# Patient Record
Sex: FEMALE | Race: WHITE | NOT HISPANIC OR LATINO | Employment: FULL TIME | URBAN - METROPOLITAN AREA
[De-identification: names, ages, dates, MRNs, and addresses within clinical notes are randomized per-mention and may not be internally consistent; named-entity substitution may affect disease eponyms.]

---

## 2017-01-01 ENCOUNTER — TRANSFERRED RECORDS (OUTPATIENT)
Dept: HEALTH INFORMATION MANAGEMENT | Facility: CLINIC | Age: 57
End: 2017-01-01

## 2017-04-03 ENCOUNTER — OFFICE VISIT (OUTPATIENT)
Dept: OBGYN | Facility: CLINIC | Age: 57
End: 2017-04-03
Payer: COMMERCIAL

## 2017-04-03 ENCOUNTER — RADIANT APPOINTMENT (OUTPATIENT)
Dept: MAMMOGRAPHY | Facility: CLINIC | Age: 57
End: 2017-04-03
Payer: COMMERCIAL

## 2017-04-03 VITALS
WEIGHT: 143 LBS | SYSTOLIC BLOOD PRESSURE: 118 MMHG | BODY MASS INDEX: 21.67 KG/M2 | HEART RATE: 72 BPM | HEIGHT: 68 IN | DIASTOLIC BLOOD PRESSURE: 74 MMHG

## 2017-04-03 DIAGNOSIS — Z78.0 MENOPAUSE: ICD-10-CM

## 2017-04-03 DIAGNOSIS — Z12.31 VISIT FOR SCREENING MAMMOGRAM: ICD-10-CM

## 2017-04-03 DIAGNOSIS — Z01.419 ENCOUNTER FOR GYNECOLOGICAL EXAMINATION WITHOUT ABNORMAL FINDING: Primary | ICD-10-CM

## 2017-04-03 PROCEDURE — G0145 SCR C/V CYTO,THINLAYER,RESCR: HCPCS | Performed by: OBSTETRICS & GYNECOLOGY

## 2017-04-03 PROCEDURE — G0202 SCR MAMMO BI INCL CAD: HCPCS | Mod: TC

## 2017-04-03 PROCEDURE — 99396 PREV VISIT EST AGE 40-64: CPT | Performed by: OBSTETRICS & GYNECOLOGY

## 2017-04-03 RX ORDER — ESTRADIOL 1 MG/1
1 TABLET ORAL DAILY
Qty: 90 TABLET | Refills: 4 | Status: SHIPPED | OUTPATIENT
Start: 2017-04-03 | End: 2018-04-05

## 2017-04-03 ASSESSMENT — ANXIETY QUESTIONNAIRES
1. FEELING NERVOUS, ANXIOUS, OR ON EDGE: SEVERAL DAYS
5. BEING SO RESTLESS THAT IT IS HARD TO SIT STILL: NOT AT ALL
2. NOT BEING ABLE TO STOP OR CONTROL WORRYING: NOT AT ALL
IF YOU CHECKED OFF ANY PROBLEMS ON THIS QUESTIONNAIRE, HOW DIFFICULT HAVE THESE PROBLEMS MADE IT FOR YOU TO DO YOUR WORK, TAKE CARE OF THINGS AT HOME, OR GET ALONG WITH OTHER PEOPLE: NOT DIFFICULT AT ALL
3. WORRYING TOO MUCH ABOUT DIFFERENT THINGS: SEVERAL DAYS
GAD7 TOTAL SCORE: 6
7. FEELING AFRAID AS IF SOMETHING AWFUL MIGHT HAPPEN: NOT AT ALL
6. BECOMING EASILY ANNOYED OR IRRITABLE: MORE THAN HALF THE DAYS

## 2017-04-03 ASSESSMENT — PATIENT HEALTH QUESTIONNAIRE - PHQ9: 5. POOR APPETITE OR OVEREATING: MORE THAN HALF THE DAYS

## 2017-04-03 NOTE — MR AVS SNAPSHOT
"              After Visit Summary   4/3/2017    Leny Wilcox    MRN: [id number]           Patient Information     Date Of Birth          1960        Visit Information        Provider Department      4/3/2017 1:30 PM Mack León MD [facility]        Today's Diagnoses     Encounter for gynecological examination without abnormal finding    -  1    Menopause           Follow-ups after your visit        Who to contact     If you have questions or need follow up information about today's clinic visit or your schedule please contact [facility] directly at [phone].  Normal or non-critical lab and imaging results will be communicated to you by [employer], letter or phone within 4 business days after the clinic has received the results. If you do not hear from us within 7 days, please contact the clinic through [employer] or phone. If you have a critical or abnormal lab result, we will notify you by phone as soon as possible.  Submit refill requests through [employer] or call your pharmacy and they will forward the refill request to us. Please allow 3 business days for your refill to be completed.          Additional Information About Your Visit        MyChart Information     [employer] lets you send messages to your doctor, view your test results, renew your prescriptions, schedule appointments and more. To sign up, go to [email or web address] . Click on \"Log in\" on the left side of the screen, which will take you to the Welcome page. Then click on \"Sign up Now\" on the right side of the page.     You will be asked to enter the access code listed below, as well as some personal information. Please follow the directions to create your username and password.     Your access code is: [email or web address]  Expires: 2017  1:53 PM     Your access code will  in 90 days. If you need help or a new code, please call your [facility] or [phone].        Care EveryWhere ID     " "This is your Care EveryWhere ID. This could be used by other organizations to access your [place] medical records  [id number]        Your Vitals Were     Pulse Height BMI (Body Mass Index)             72 5' 8\" (1.727 m) 21.74 kg/m2          Blood Pressure from Last 3 Encounters:   04/03/17 118/74   12/05/16 126/72   02/01/16 110/60    Weight from Last 3 Encounters:   04/03/17 143 lb (64.9 kg)   12/05/16 140 lb (63.5 kg)   02/01/16 138 lb (62.6 kg)              We Performed the Following     Pap imaged thin layer screen reflex to HPV if ASCUS - recommended age 25 - 29 years          Where to get your medicines      These medications were sent to [facility] [id number] - [address] AT [facility] [address]  [address] [place] [address], [place] [address]     Phone:  [phone]     estradiol 1 MG tablet          Primary Care Provider    None Specified       No primary provider on file.        Thank you!     Thank you for choosing [facility] [place]  for your care. Our goal is always to provide you with excellent care. Hearing back from our patients is one way we can continue to improve our services. Please take a few minutes to complete the written survey that you may receive in the mail after your visit with us. Thank you!             Your Updated Medication List - Protect others around you: Learn how to safely use, store and throw away your medicines at www.disposemymeds.org.          This list is accurate as of: 4/3/17  2:31 PM.  Always use your most recent med list.                   Brand Name Dispense Instructions for use    estradiol 1 MG tablet    ESTRACE    90 tablet    Take 1 tablet (1 mg) by mouth daily       multivitamin, therapeutic with minerals Tabs tablet      Take 1 tablet by mouth daily         "

## 2017-04-03 NOTE — PROGRESS NOTES
Leny is a 56 year old  female who presents for annual exam.     Besides routine health maintenance,  she would like to discuss trouble sleeping. Would like to get on medication to help.    HPI: The patient is seen for her annual exam. She is stressed by the needs of her adult children and her  having just completed therapy for prostate cancer. She is not sleeping well and is already on Estrace 1 mg a day.  No primary care provider on file..        GYNECOLOGIC HISTORY:    No LMP recorded. Patient has had a hysterectomy.  Her current contraception method is: hysterectomy.  She  reports that she has never smoked. She has never used smokeless tobacco.    Patient is not sexually active.  STD testing offered?  Declined  Last PHQ-9 score on record =   PHQ-9 SCORE 4/3/2017   Total Score 9     Last GAD7 score on record =   CECILIA-7 SCORE 4/3/2017   Total Score 6     Alcohol Score = 2    HEALTH MAINTENANCE:  Cholesterol: 12   Total= 228, Triglycerides=61, HDL=90, RRF=129   Last Mammo: 16, Result: normal, Next Mammo: today   Pap: 16 neg  Colonoscopy:  approx 2017 per patient, Result: normal, Next Colonoscopy: repeat in 5 years.  Dexa:  1/19/15    Health maintenance updated:  yes    HISTORY:  Obstetric History       T5      TAB0   SAB0   E0   M0   L5       # Outcome Date GA Lbr Ramírez/2nd Weight Sex Delivery Anes PTL Lv   5 Term            4 Term            3 Term            2 Term            1 Term                   Patient Active Problem List   Diagnosis     Arthritis     Past Surgical History:   Procedure Laterality Date     COLONOSCOPY       LAPAROSCOPIC ASSISTED HYSTERECTOMY VAGINAL, BILATERAL SALPINGO-OOPHORECTOMY, COMBINED       REPAIR FISTULA RECTOVAGINAL        Social History   Substance Use Topics     Smoking status: Never Smoker     Smokeless tobacco: Never Used     Alcohol use 0.0 oz/week     0 Standard drinks or equivalent per week      Comment: occ      Problem  "(# of Occurrences) Relation (Name,Age of Onset)    Arthritis (1) Mother    Asthma (1) Mother    Colon Cancer (1) Father    DIABETES (1) Maternal Grandmother    Heart Failure (1) Mother    Hypertension (2) Mother, Father    Substance Abuse (1) Brother            Current Outpatient Prescriptions   Medication Sig     estradiol (ESTRACE) 1 MG tablet Take 1 tablet (1 mg) by mouth daily     multivitamin, therapeutic with minerals (THERA-VIT-M) TABS Take 1 tablet by mouth daily     No current facility-administered medications for this visit.      No Known Allergies    Past medical, surgical, social and family histories were reviewed and updated in EPIC.    ROS:   12 point review of systems negative other than symptoms noted below.  Psychiatric: Difficulty Sleeping    EXAM:  Ht 5' 8\" (1.727 m)  Wt 143 lb (64.9 kg)  BMI 21.74 kg/m2   BMI: Body mass index is 21.74 kg/(m^2).    PHYSICAL EXAM:  Constitutional:  Appearance: Well nourished, well developed, alert, in no acute distress  Neck:  Lymph Nodes:  No lymphadenopathy present    Thyroid:  Gland size normal, nontender, no nodules or masses present  on palpation  Chest:  Respiratory Effort:  Breathing unlabored  Cardiovascular:    Heart: Auscultation:  Regular rate, normal rhythm, no murmurs present  Breasts: Inspection of Breasts:  No lymphadenopathy present    Palpation of Breasts and Axillae:  No masses present on palpation, no  breast tenderness    Axillary Lymph Nodes:  No lymphadenopathy present  Gastrointestinal:   Abdominal Examination:  Abdomen nontender to palpation, tone normal without rigidity or guarding, no masses present, umbilicus without lesions   Liver and Spleen:  No hepatomegaly present, liver nontender to palpation    Hernias:  No hernias present  Lymphatic: Lymph Nodes:  No other lymphadenopathy present  Skin:  General Inspection:  No rashes present, no lesions present, no areas of  discoloration    Genitalia and Groin:  No rashes present, no lesions " present, no areas of  discoloration, no masses present  Neurologic/Psychiatric:    Mental Status:  Oriented X3     Pelvic Exam:  External Genitalia:     Normal appearance for age, no discharge present, no tenderness present, no inflammatory lesions present, color normal  Vagina:     Normal vaginal vault without central or paravaginal defects, no discharge present, no inflammatory lesions present, no masses present  Bladder:     Nontender to palpation  Urethra:   Urethral Body:  Urethra palpation normal, urethra structural support normal   Urethral Meatus:  No erythema or lesions present  Cervix:     Surgically absent  Uterus:     Surgically absent  Adnexa:     Surgically absent  Perineum:     Perineum within normal limits, no evidence of trauma, no rashes or skin lesions present  Anus:     Anus within normal limits, no hemorrhoids present  Inguinal Lymph Nodes:     No lymphadenopathy present    COUNSELING:   Reviewed preventive health counseling, as reflected in patient instructions       Regular exercise       Healthy diet/nutrition    BMI: Body mass index is 21.74 kg/(m^2).      ASSESSMENT:  56 year old female with satisfactory annual exam.    ICD-10-CM    1. Encounter for gynecological examination without abnormal finding Z01.419        PLAN: We will convey both her Pap smear and mammogram results. We will refill her Estrace.      Mack León MD

## 2017-04-03 NOTE — LETTER
[facility]  [address] [place] [address] , [address]  HYACINTH Hernandez   [phone]      4/5/2017     Leny Wilcox   [facility] [address]      Dear Leny,  We are happy to inform you that your PAP smear result is normal.  We are now able to do a follow up test on PAP smears. The DNA test is for HPV (Human Papilloma Virus). Cervical cancer is closely linked with certain types of HPV. Your result showed no evidence of HPV.  Therefore we recommend you return in 1 year for your next pap smear.  You will still need to return to the clinic every year for an annual exam and other preventive tests.  Please contact the clinic with any questions.  Sincerely,    Mack Romero MD

## 2017-04-04 ASSESSMENT — PATIENT HEALTH QUESTIONNAIRE - PHQ9: SUM OF ALL RESPONSES TO PHQ QUESTIONS 1-9: 9

## 2017-04-04 ASSESSMENT — ANXIETY QUESTIONNAIRES: GAD7 TOTAL SCORE: 6

## 2017-04-05 LAB
COPATH REPORT: NORMAL
PAP: NORMAL

## 2017-12-05 DIAGNOSIS — Z78.0 MENOPAUSE: ICD-10-CM

## 2017-12-05 RX ORDER — ESTRADIOL 1 MG/1
TABLET ORAL
Qty: 90 TABLET | Refills: 0 | OUTPATIENT
Start: 2017-12-05

## 2017-12-05 NOTE — TELEPHONE ENCOUNTER
Estradiol 1mg      Last Written Prescription Date:  4/3/17  Last Fill Quantity: 90,   # refills: 4  Last Office Visit with [facility] primary care provider:  4/3/17  Future Office visit: none    Refill request too soon, Rx denied.

## 2018-04-05 ENCOUNTER — RADIANT APPOINTMENT (OUTPATIENT)
Dept: BONE DENSITY | Facility: CLINIC | Age: 58
End: 2018-04-05
Attending: OBSTETRICS & GYNECOLOGY
Payer: COMMERCIAL

## 2018-04-05 ENCOUNTER — OFFICE VISIT (OUTPATIENT)
Dept: OBGYN | Facility: CLINIC | Age: 58
End: 2018-04-05
Payer: COMMERCIAL

## 2018-04-05 ENCOUNTER — RADIANT APPOINTMENT (OUTPATIENT)
Dept: MAMMOGRAPHY | Facility: CLINIC | Age: 58
End: 2018-04-05
Payer: COMMERCIAL

## 2018-04-05 VITALS
SYSTOLIC BLOOD PRESSURE: 112 MMHG | WEIGHT: 142 LBS | DIASTOLIC BLOOD PRESSURE: 64 MMHG | HEIGHT: 68 IN | BODY MASS INDEX: 21.52 KG/M2 | HEART RATE: 74 BPM

## 2018-04-05 DIAGNOSIS — Z78.0 MENOPAUSE: ICD-10-CM

## 2018-04-05 DIAGNOSIS — Z01.419 ENCOUNTER FOR GYNECOLOGICAL EXAMINATION WITHOUT ABNORMAL FINDING: Primary | ICD-10-CM

## 2018-04-05 DIAGNOSIS — Z12.31 VISIT FOR SCREENING MAMMOGRAM: ICD-10-CM

## 2018-04-05 PROCEDURE — 99396 PREV VISIT EST AGE 40-64: CPT | Performed by: OBSTETRICS & GYNECOLOGY

## 2018-04-05 PROCEDURE — 77067 SCR MAMMO BI INCL CAD: CPT | Mod: TC

## 2018-04-05 PROCEDURE — 77080 DXA BONE DENSITY AXIAL: CPT | Performed by: OBSTETRICS & GYNECOLOGY

## 2018-04-05 PROCEDURE — 87624 HPV HI-RISK TYP POOLED RSLT: CPT | Performed by: OBSTETRICS & GYNECOLOGY

## 2018-04-05 PROCEDURE — G0145 SCR C/V CYTO,THINLAYER,RESCR: HCPCS | Performed by: OBSTETRICS & GYNECOLOGY

## 2018-04-05 RX ORDER — ESTRADIOL 1 MG/1
1 TABLET ORAL DAILY
Qty: 90 TABLET | Refills: 3 | Status: SHIPPED | OUTPATIENT
Start: 2018-04-05 | End: 2018-04-05

## 2018-04-05 RX ORDER — ESTRADIOL 1 MG/1
1 TABLET ORAL DAILY
Qty: 90 TABLET | Refills: 3 | Status: SHIPPED | OUTPATIENT
Start: 2018-04-05 | End: 2019-05-16

## 2018-04-05 ASSESSMENT — ANXIETY QUESTIONNAIRES
1. FEELING NERVOUS, ANXIOUS, OR ON EDGE: NOT AT ALL
5. BEING SO RESTLESS THAT IT IS HARD TO SIT STILL: NOT AT ALL
7. FEELING AFRAID AS IF SOMETHING AWFUL MIGHT HAPPEN: NOT AT ALL
2. NOT BEING ABLE TO STOP OR CONTROL WORRYING: NOT AT ALL
3. WORRYING TOO MUCH ABOUT DIFFERENT THINGS: NOT AT ALL
GAD7 TOTAL SCORE: 0
6. BECOMING EASILY ANNOYED OR IRRITABLE: NOT AT ALL
IF YOU CHECKED OFF ANY PROBLEMS ON THIS QUESTIONNAIRE, HOW DIFFICULT HAVE THESE PROBLEMS MADE IT FOR YOU TO DO YOUR WORK, TAKE CARE OF THINGS AT HOME, OR GET ALONG WITH OTHER PEOPLE: NOT DIFFICULT AT ALL

## 2018-04-05 ASSESSMENT — PATIENT HEALTH QUESTIONNAIRE - PHQ9: 5. POOR APPETITE OR OVEREATING: NOT AT ALL

## 2018-04-05 NOTE — MR AVS SNAPSHOT
"              After Visit Summary   2018    Leny Wilcox    MRN: [id number]           Patient Information     Date Of Birth          1960        Visit Information        Provider Department      2018 3:15 PM Mack Le[facility] MD [facility]        Today's Diagnoses     Encounter for gynecological examination without abnormal finding    -  1    Menopause           Follow-ups after your visit        Who to contact     If you have questions or need follow up information about today's clinic visit or your schedule please contact [facility] directly at [phone].  Normal or non-critical lab and imaging results will be communicated to you by [employer], letter or phone within 4 business days after the clinic has received the results. If you do not hear from us within 7 days, please contact the clinic through [employer] or phone. If you have a critical or abnormal lab result, we will notify you by phone as soon as possible.  Submit refill requests through [employer] or call your pharmacy and they will forward the refill request to us. Please allow 3 business days for your refill to be completed.          Additional Information About Your Visit        MyChart Information     [employer] lets you send messages to your doctor, view your test results, renew your prescriptions, schedule appointments and more. To sign up, go to [email or web address] . Click on \"Log in\" on the left side of the screen, which will take you to the Welcome page. Then click on \"Sign up Now\" on the right side of the page.     You will be asked to enter the access code listed below, as well as some personal information. Please follow the directions to create your username and password.     Your access code is: [email or web address]  Expires: 2018  3:41 PM     Your access code will  in 90 days. If you need help or a new code, please call your [facility] or [phone].        Care EveryWhere ID     " "This is your Care EveryWhere ID. This could be used by other organizations to access your [place] medical records  [id number]        Your Vitals Were     Pulse Height BMI (Body Mass Index)             74 5' 8\" (1.727 m) 21.59 kg/m2          Blood Pressure from Last 3 Encounters:   04/05/18 112/64   04/03/17 118/74   12/05/16 126/72    Weight from Last 3 Encounters:   04/05/18 142 lb (64.4 kg)   04/03/17 143 lb (64.9 kg)   12/05/16 140 lb (63.5 kg)              We Performed the Following     HPV High Risk Types DNA Cervical     Pap imaged thin layer screen with HPV - recommended age 30 - 65          Today's Medication Changes          These changes are accurate as of 4/5/18  3:41 PM.  If you have any questions, ask your nurse or doctor.               Start taking these medicines.        Dose/Directions    estradiol 1 MG tablet   Commonly known as:  ESTRACE   Used for:  Menopause   Started by:  Mack León MD        Dose:  1 mg   Take 1 tablet (1 mg) by mouth daily   Quantity:  90 tablet   Refills:  3            Where to get your medicines      These medications were sent to [facility] [id number] [place] [address] AT [facility] [address]  [address] [place] [address], [place] [address]     Phone:  [phone]     estradiol 1 MG tablet                Primary Care Provider Office Phone # Fax #    [facility] [phone]       [place] [address]  [facility] [address]  [facility] [address]        Equal Access to Services     JOSUE JENKINS : Hadii bud meza hadasho Soomaali, waaxda luqadaha, qaybta kaalmada adeegdana, aleksey jo. So [facility] [phone].    ATENCIÓN: Si habla español, tiene a mitchell disposición servicios gratuitos de asistencia lingüística. Llame al [phone].    We comply with applicable federal civil rights laws and [place] laws. We do not discriminate on the basis of race, " color, national origin, age, disability, sex, sexual orientation, or gender identity.            Thank you!     Thank you for choosing [facility] [place]  for your care. Our goal is always to provide you with excellent care. Hearing back from our patients is one way we can continue to improve our services. Please take a few minutes to complete the written survey that you may receive in the mail after your visit with us. Thank you!             Your Updated Medication List - Protect others around you: Learn how to safely use, store and throw away your medicines at www.disposemymeds.org.          This list is accurate as of 4/5/18  3:41 PM.  Always use your most recent med list.                   Brand Name Dispense Instructions for use Diagnosis    estradiol 1 MG tablet    ESTRACE    90 tablet    Take 1 tablet (1 mg) by mouth daily    Menopause       multivitamin, therapeutic with minerals Tabs tablet      Take 1 tablet by mouth daily

## 2018-04-05 NOTE — Clinical Note
Please abstract the following data from this visit with this patient into the appropriate field in Epic:  Colonoscopy done on this date: 01/01/2017 (approximately), by this group: [facility] by Dr Vicente Brooks, results were some polyps that were removed. Needs 5 year f/u

## 2018-04-05 NOTE — PROGRESS NOTES
Leny is a 57 year old  female who presents for annual exam.     Besides routine health maintenance, she has no other health concerns today .    HPI: The patient is seen at this time for her annual exam.  She is on estrogen replacement therapy and doing well.  She is concerned about her bone strength in her last DEXA was in .  The patient's past PCP moved, so currently doesn't have one.      GYNECOLOGIC HISTORY:    No LMP recorded. Patient has had a hysterectomy.  Her current contraception method is: hysterectomy and not sexually active.  She  reports that she has never smoked. She has never used smokeless tobacco.    Patient is not sexually active.  STD testing offered?  Declined  Last PHQ-9 score on record =   PHQ-9 SCORE 2018   Total Score 0     Last GAD7 score on record =   CECILIA-7 SCORE 2018   Total Score 0     Alcohol Score = 1    HEALTH MAINTENANCE:  Lipids: 12   Total= 228, Triglycerides=61, HDL=90, MHH=592  Last Mammo: one year ago, Result: normal, Next Mammo: today   Pap:   Lab Results   Component Value Date    PAP NIL 2017    PAP NIL 2016      Colonoscopy:  2017, Result: polyps, Next Colonoscopy: 4 years.  Dexa:  01/19/15    Health maintenance updated:  yes    HISTORY:  Obstetric History       T5      L5     SAB0   TAB0   Ectopic0   Multiple0   Live Births0       # Outcome Date GA Lbr Ramírez/2nd Weight Sex Delivery Anes PTL Lv   5 Term            4 Term            3 Term            2 Term            1 Term                   Patient Active Problem List   Diagnosis     Arthritis     Past Surgical History:   Procedure Laterality Date     COLONOSCOPY       LAPAROSCOPIC ASSISTED HYSTERECTOMY VAGINAL, BILATERAL SALPINGO-OOPHORECTOMY, COMBINED       REPAIR FISTULA RECTOVAGINAL        Social History   Substance Use Topics     Smoking status: Never Smoker     Smokeless tobacco: Never Used     Alcohol use 0.0 oz/week     0 Standard drinks or equivalent  "per week      Comment: occ      Problem (# of Occurrences) Relation (Name,Age of Onset)    Arthritis (1) Mother    Asthma (1) Mother    Colon Cancer (1) Father    DIABETES (1) Maternal Grandmother    Heart Failure (1) Mother    Hypertension (2) Mother, Father    Substance Abuse (1) Brother            Current Outpatient Prescriptions   Medication Sig     estradiol (ESTRACE) 1 MG tablet Take 1 tablet (1 mg) by mouth daily     multivitamin, therapeutic with minerals (THERA-VIT-M) TABS Take 1 tablet by mouth daily     No current facility-administered medications for this visit.      No Known Allergies    Past medical, surgical, social and family histories were reviewed and updated in EPIC.    ROS:   12 point review of systems negative other than symptoms noted below.  Constitutional: Weight Gain  Genitourinary: Urgency  Musculoskeletal: Muscle Cramps    EXAM:  /64  Pulse 74  Ht 5' 8\" (1.727 m)  Wt 142 lb (64.4 kg)  BMI 21.59 kg/m2   BMI: Body mass index is 21.59 kg/(m^2).    PHYSICAL EXAM:  Constitutional:  Appearance: Well nourished, well developed, alert, in no acute distress  Neck:  Lymph Nodes:  No lymphadenopathy present    Thyroid:  Gland size normal, nontender, no nodules or masses present  on palpation  Chest:  Respiratory Effort:  Breathing unlabored  Cardiovascular:    Heart: Auscultation:  Regular rate, normal rhythm, no murmurs present  Breasts: Inspection of Breasts:  No lymphadenopathy present., Palpation of Breasts and Axillae:  No masses present on palpation, no breast tenderness., Axillary Lymph Nodes:  No lymphadenopathy present. and No nodularity, asymmetry or nipple discharge bilaterally.  Gastrointestinal:   Abdominal Examination:  Abdomen nontender to palpation, tone normal without rigidity or guarding, no masses present, umbilicus without lesions   Liver and Spleen:  No hepatomegaly present, liver nontender to palpation    Hernias:  No hernias present  Lymphatic: Lymph Nodes:  No other " lymphadenopathy present  Skin:  General Inspection:  No rashes present, no lesions present, no areas of  discoloration    Genitalia and Groin:  No rashes present, no lesions present, no areas of  discoloration, no masses present  Neurologic/Psychiatric:    Mental Status:  Oriented X3     Pelvic Exam:  External Genitalia:     Normal appearance for age, no discharge present, no tenderness present, no inflammatory lesions present, color normal  Vagina:     Normal vaginal vault without central or paravaginal defects, no discharge present, no inflammatory lesions present, no masses present  Bladder:     Nontender to palpation  Urethra:   Urethral Body:  Urethra palpation normal, urethra structural support normal   Urethral Meatus:  No erythema or lesions present  Cervix:     Surgically absent  Uterus:     Surgically absent  Adnexa:     Surgically absent  Perineum:     Perineum within normal limits, no evidence of trauma, no rashes or skin lesions present  Anus:     Anus within normal limits, no hemorrhoids present  Inguinal Lymph Nodes:     No lymphadenopathy present    COUNSELING:   Reviewed preventive health counseling, as reflected in patient instructions    BMI: Body mass index is 21.59 kg/(m^2).      ASSESSMENT:  57 year old female with satisfactory annual exam.    ICD-10-CM    1. Encounter for gynecological examination without abnormal finding Z01.419 Pap imaged thin layer screen with HPV - recommended age 30 - 65     HPV High Risk Types DNA Cervical   2. Menopause Z78.0 estradiol (ESTRACE) 1 MG tablet       PLAN: We will convey her Pap mammogram and DEXA results when available.      Mack León MD

## 2018-04-05 NOTE — NURSING NOTE
Please abstract the following data from this visit with this patient into the appropriate field in Epic:    Colonoscopy done on this date: 01/01/2017 (approximately), by this group: [facility] by Dr Vicente Brooks, results were some polyps that were removed. Needs 5 year f/u

## 2018-04-06 ASSESSMENT — PATIENT HEALTH QUESTIONNAIRE - PHQ9: SUM OF ALL RESPONSES TO PHQ QUESTIONS 1-9: 0

## 2018-04-06 ASSESSMENT — ANXIETY QUESTIONNAIRES: GAD7 TOTAL SCORE: 0

## 2018-04-10 LAB
COPATH REPORT: NORMAL
PAP: NORMAL

## 2018-04-13 LAB
FINAL DIAGNOSIS: NORMAL
HPV HR 12 DNA CVX QL NAA+PROBE: NEGATIVE
HPV16 DNA SPEC QL NAA+PROBE: NEGATIVE
HPV18 DNA SPEC QL NAA+PROBE: NEGATIVE
SPECIMEN DESCRIPTION: NORMAL
SPECIMEN SOURCE CVX/VAG CYTO: NORMAL

## 2018-09-07 ENCOUNTER — RECORDS - HEALTHEAST (OUTPATIENT)
Dept: ADMINISTRATIVE | Facility: OTHER | Age: 58
End: 2018-09-07

## 2018-09-10 ENCOUNTER — AMBULATORY - HEALTHEAST (OUTPATIENT)
Dept: VASCULAR SURGERY | Facility: CLINIC | Age: 58
End: 2018-09-10

## 2018-09-10 DIAGNOSIS — I87.2 PERIPHERAL VENOUS INSUFFICIENCY: ICD-10-CM

## 2018-12-06 DIAGNOSIS — Z23 NEED FOR SHINGLES VACCINE: ICD-10-CM

## 2018-12-06 PROCEDURE — 90750 HZV VACC RECOMBINANT IM: CPT

## 2018-12-06 PROCEDURE — 90471 IMMUNIZATION ADMIN: CPT

## 2018-12-06 NOTE — PROGRESS NOTES
Screening Questionnaire for Adult Immunization    Are you sick today?   No   Do you have allergies to medications, food, a vaccine component or latex?   No   Have you ever had a serious reaction after receiving a vaccination?   No   Do you have a long-term health problem with heart disease, lung disease, asthma, kidney disease, metabolic disease (e.g. diabetes), anemia, or other blood disorder?   No   Do you have cancer, leukemia, HIV/AIDS, or any other immune system problem?   No   In the past 3 months, have you taken medications that affect  your immune system, such as prednisone, other steroids, or anticancer drugs; drugs for the treatment of rheumatoid arthritis, Crohn s disease, or psoriasis; or have you had radiation treatments?   No   Have you had a seizure, or a brain or other nervous system problem?   No   During the past year, have you received a transfusion of blood or blood     products, or been given immune (gamma) globulin or antiviral drug?   No   For women: Are you pregnant or is there a chance you could become        pregnant during the next month?   No   Have you received any vaccinations in the past 4 weeks?   No     Immunization questionnaire answers were all negative.        Per orders of Dr. León, injection of Shingrix given by Chio Serna. Patient instructed to remain in clinic for 15 minutes afterwards, and to report any adverse reaction to me immediately.       Screening performed by Chio Serna on 12/6/2018 at 2:20 PM.

## 2019-03-05 ENCOUNTER — TELEPHONE (OUTPATIENT)
Dept: OBGYN | Facility: CLINIC | Age: 59
End: 2019-03-05

## 2019-03-05 DIAGNOSIS — N95.1 SYMPTOMS, SUCH AS FLUSHING, SLEEPLESSNESS, HEADACHE, LACK OF CONCENTRATION, ASSOCIATED WITH THE MENOPAUSE: Primary | ICD-10-CM

## 2019-03-05 NOTE — TELEPHONE ENCOUNTER
Discussed with Dr. León.     She is actually on 1mg which is a good dose of estrogen. She has been on this dose for quite a while. With a new change in symptoms, dr. León is requesting she have her thyroid function tested.    Orders placed for future labs. She can go to any [facility] clinic for lab draw and we will go from there.     We will not increase E2 dose at this time.

## 2019-03-05 NOTE — TELEPHONE ENCOUNTER
4/5/18 Annual. Pt states is on the lowest dose of estradiol. Pt has been having night sweats, mood changes and not sleeping well over the last 1.5 months. Pt would like to up the dose of Estradiol.  Annual scheduled for 5/7/19. Routing to Jing Miller. Please advise.     4/5/18 HPI: The patient is seen at this time for her annual exam.  She is on estrogen replacement therapy and doing well.  She is concerned about her bone strength in her last DEXA was in 2015.

## 2019-04-01 DIAGNOSIS — N95.1 SYMPTOMS, SUCH AS FLUSHING, SLEEPLESSNESS, HEADACHE, LACK OF CONCENTRATION, ASSOCIATED WITH THE MENOPAUSE: ICD-10-CM

## 2019-04-01 PROCEDURE — 84443 ASSAY THYROID STIM HORMONE: CPT | Performed by: OBSTETRICS & GYNECOLOGY

## 2019-04-01 PROCEDURE — 36415 COLL VENOUS BLD VENIPUNCTURE: CPT | Performed by: OBSTETRICS & GYNECOLOGY

## 2019-04-02 LAB — TSH SERPL DL<=0.005 MIU/L-ACNC: 2.09 MU/L (ref 0.4–4)

## 2019-05-10 NOTE — PROGRESS NOTES
Leny is a 58 year old  female who presents for annual exam.     Besides routine health maintenance,     HPI: The patient is seen at this time for her annual exam.  She has no real change in her health history is concerned about an increase of a couple pounds in her weight but she is not exercising or dieting at all.  She is not overweight.  She has had a previous hysterectomy and is menopausal.  She has been on Estrace successfully in the past.  The patient's PCP Dr. Crabtree.        GYNECOLOGIC HISTORY:    No LMP recorded. Patient has had a hysterectomy.  Her current contraception method is: not sexually active.  She  reports that she has never smoked. She has never used smokeless tobacco.    Patient is not sexually active.  STD testing offered?  Declined  Last PHQ-9 score on record =   PHQ-9 SCORE 2019   PHQ-9 Total Score 3     Last GAD7 score on record =   CECILIA-7 SCORE 2019   Total Score 1     Alcohol Score = 1    HEALTH MAINTENANCE:  Cholesterol:   Cholesterol   Date Value Ref Range Status   2012 228 (A) 115 - 199 mg/dL Final      Last Mammo: one year ago, Result: normal, Next Mammo: today   Pap:   Lab Results   Component Value Date    PAP NIL HPV- 2018    PAP NIL 2017    PAP NIL 2016      Colonoscopy:  17, Result: polyps, Next Colonoscopy: .  Dexa:  18    Health maintenance updated:  yes    HISTORY:  OB History    Para Term  AB Living   5 5 5 0 0 5   SAB TAB Ectopic Multiple Live Births   0 0 0 0 0      # Outcome Date GA Lbr Ramírez/2nd Weight Sex Delivery Anes PTL Lv   5 Term            4 Term            3 Term            2 Term            1 Term                Patient Active Problem List   Diagnosis     Arthritis     Past Surgical History:   Procedure Laterality Date     COLONOSCOPY       HYSTERECTOMY, PAP NO LONGER INDICATED       LAPAROSCOPIC ASSISTED HYSTERECTOMY VAGINAL, BILATERAL SALPINGO-OOPHORECTOMY, COMBINED       REPAIR FISTULA  "RECTOVAGINAL  2011      Social History     Tobacco Use     Smoking status: Never Smoker     Smokeless tobacco: Never Used   Substance Use Topics     Alcohol use: Yes     Alcohol/week: 0.0 oz     Comment: occ      Problem (# of Occurrences) Relation (Name,Age of Onset)    Arthritis (1) Mother    Asthma (1) Mother    Colon Cancer (1) Father    Diabetes (1) Maternal Grandmother    Heart Failure (1) Mother    Hypertension (2) Mother, Father    Substance Abuse (1) Brother            Current Outpatient Medications   Medication Sig     estradiol (ESTRACE) 1 MG tablet Take 1 tablet (1 mg) by mouth daily     multivitamin, therapeutic with minerals (THERA-VIT-M) TABS Take 1 tablet by mouth daily     No current facility-administered medications for this visit.      No Known Allergies    Past medical, surgical, social and family histories were reviewed and updated in EPIC.    ROS:   12 point review of systems negative other than symptoms noted below.  Constitutional: Weight Gain    EXAM:  /62   Pulse 68   Ht 1.715 m (5' 7.5\")   Wt 65.3 kg (144 lb)   BMI 22.22 kg/m     BMI: Body mass index is 22.22 kg/m .    PHYSICAL EXAM:  Constitutional:  Appearance: Well nourished, well developed, alert, in no acute distress  Neck:  Lymph Nodes:  No lymphadenopathy present    Thyroid:  Gland size normal, nontender, no nodules or masses present  on palpation  Chest:  Respiratory Effort:  Breathing unlabored  Cardiovascular:    Heart: Auscultation:  Regular rate, normal rhythm, no murmurs present  Breasts: Inspection of Breasts:  No lymphadenopathy present., Palpation of Breasts and Axillae:  No masses present on palpation, no breast tenderness., Axillary Lymph Nodes:  No lymphadenopathy present. and No nodularity, asymmetry or nipple discharge bilaterally.  Gastrointestinal:   Abdominal Examination:  Abdomen nontender to palpation, tone normal without rigidity or guarding, no masses present, umbilicus without lesions   Liver and " Spleen:  No hepatomegaly present, liver nontender to palpation    Hernias:  No hernias present  Lymphatic: Lymph Nodes:  No other lymphadenopathy present  Skin:  General Inspection:  No rashes present, no lesions present, no areas of  discoloration    Genitalia and Groin:  No rashes present, no lesions present, no areas of  discoloration, no masses present  Neurologic/Psychiatric:    Mental Status:  Oriented X3     Pelvic Exam:  External Genitalia:     Normal appearance for age, no discharge present, no tenderness present, no inflammatory lesions present, color normal  Vagina:     Normal vaginal vault without central or paravaginal defects, no discharge present, no inflammatory lesions present, no masses present  Bladder:     Nontender to palpation  Urethra:   Urethral Body:  Urethra palpation normal, urethra structural support normal   Urethral Meatus:  No erythema or lesions present  Cervix:     Surgically absent  Uterus:     Surgically absent  Adnexa:     Surgically absent  Perineum:     Perineum within normal limits, no evidence of trauma, no rashes or skin lesions present  Anus:     Anus within normal limits, no hemorrhoids present  Inguinal Lymph Nodes:     No lymphadenopathy present    COUNSELING:   Reviewed preventive health counseling, as reflected in patient instructions       Regular exercise       Healthy diet/nutrition    BMI: Body mass index is 22.22 kg/m .      ASSESSMENT:  58 year old female with satisfactory annual exam.    ICD-10-CM    1. Encounter for gynecological examination without abnormal finding Z01.419 Pap imaged thin layer screen with HPV - recommended age 30 - 65     HPV High Risk Types DNA Cervical   2. Menopause Z78.0        PLAN: The patient is seen at this time for annual exam.  Her BMI is completely normal.  We have encouraged her to exercise.  We will convey her mammogram and Pap results when available.  She has had a hysterectomy and is never had an abnormal Pap and will probably  need no further Paps.      Mack León MD

## 2019-05-16 ENCOUNTER — ANCILLARY PROCEDURE (OUTPATIENT)
Dept: MAMMOGRAPHY | Facility: CLINIC | Age: 59
End: 2019-05-16
Payer: COMMERCIAL

## 2019-05-16 ENCOUNTER — OFFICE VISIT (OUTPATIENT)
Dept: OBGYN | Facility: CLINIC | Age: 59
End: 2019-05-16
Payer: COMMERCIAL

## 2019-05-16 VITALS
SYSTOLIC BLOOD PRESSURE: 104 MMHG | HEART RATE: 68 BPM | HEIGHT: 68 IN | DIASTOLIC BLOOD PRESSURE: 62 MMHG | WEIGHT: 144 LBS | BODY MASS INDEX: 21.82 KG/M2

## 2019-05-16 DIAGNOSIS — Z01.419 ENCOUNTER FOR GYNECOLOGICAL EXAMINATION WITHOUT ABNORMAL FINDING: Primary | ICD-10-CM

## 2019-05-16 DIAGNOSIS — Z12.31 VISIT FOR SCREENING MAMMOGRAM: ICD-10-CM

## 2019-05-16 DIAGNOSIS — Z78.0 MENOPAUSE: ICD-10-CM

## 2019-05-16 PROCEDURE — 99396 PREV VISIT EST AGE 40-64: CPT | Performed by: OBSTETRICS & GYNECOLOGY

## 2019-05-16 PROCEDURE — 87624 HPV HI-RISK TYP POOLED RSLT: CPT | Performed by: OBSTETRICS & GYNECOLOGY

## 2019-05-16 PROCEDURE — 77067 SCR MAMMO BI INCL CAD: CPT | Mod: TC

## 2019-05-16 PROCEDURE — G0145 SCR C/V CYTO,THINLAYER,RESCR: HCPCS | Performed by: OBSTETRICS & GYNECOLOGY

## 2019-05-16 RX ORDER — ESTRADIOL 1 MG/1
1 TABLET ORAL DAILY
Qty: 90 TABLET | Refills: 3 | Status: SHIPPED | OUTPATIENT
Start: 2019-05-16 | End: 2020-05-20

## 2019-05-16 ASSESSMENT — ANXIETY QUESTIONNAIRES
2. NOT BEING ABLE TO STOP OR CONTROL WORRYING: NOT AT ALL
GAD7 TOTAL SCORE: 1
1. FEELING NERVOUS, ANXIOUS, OR ON EDGE: NOT AT ALL
IF YOU CHECKED OFF ANY PROBLEMS ON THIS QUESTIONNAIRE, HOW DIFFICULT HAVE THESE PROBLEMS MADE IT FOR YOU TO DO YOUR WORK, TAKE CARE OF THINGS AT HOME, OR GET ALONG WITH OTHER PEOPLE: SOMEWHAT DIFFICULT
5. BEING SO RESTLESS THAT IT IS HARD TO SIT STILL: NOT AT ALL
7. FEELING AFRAID AS IF SOMETHING AWFUL MIGHT HAPPEN: NOT AT ALL
3. WORRYING TOO MUCH ABOUT DIFFERENT THINGS: NOT AT ALL
6. BECOMING EASILY ANNOYED OR IRRITABLE: SEVERAL DAYS

## 2019-05-16 ASSESSMENT — PATIENT HEALTH QUESTIONNAIRE - PHQ9
SUM OF ALL RESPONSES TO PHQ QUESTIONS 1-9: 3
5. POOR APPETITE OR OVEREATING: NOT AT ALL

## 2019-05-16 ASSESSMENT — MIFFLIN-ST. JEOR: SCORE: 1268.74

## 2019-05-17 ASSESSMENT — ANXIETY QUESTIONNAIRES: GAD7 TOTAL SCORE: 1

## 2019-05-21 LAB
COPATH REPORT: NORMAL
PAP: NORMAL

## 2019-08-27 ENCOUNTER — OFFICE VISIT - HEALTHEAST (OUTPATIENT)
Dept: VASCULAR SURGERY | Facility: CLINIC | Age: 59
End: 2019-08-27

## 2019-08-27 ENCOUNTER — COMMUNICATION - HEALTHEAST (OUTPATIENT)
Dept: TELEHEALTH | Facility: CLINIC | Age: 59
End: 2019-08-27

## 2019-08-27 DIAGNOSIS — I83.813 VARICOSE VEINS OF BILATERAL LOWER EXTREMITIES WITH PAIN: ICD-10-CM

## 2019-08-27 ASSESSMENT — MIFFLIN-ST. JEOR: SCORE: 1256.48

## 2019-11-18 ENCOUNTER — RECORDS - HEALTHEAST (OUTPATIENT)
Dept: LAB | Facility: CLINIC | Age: 59
End: 2019-11-18

## 2019-11-18 LAB
ALBUMIN SERPL-MCNC: 3.9 G/DL (ref 3.5–5)
ALP SERPL-CCNC: 81 U/L (ref 45–120)
ALT SERPL W P-5'-P-CCNC: 10 U/L (ref 0–45)
AST SERPL W P-5'-P-CCNC: 18 U/L (ref 0–40)
BILIRUB DIRECT SERPL-MCNC: <0.1 MG/DL
BILIRUB SERPL-MCNC: 0.2 MG/DL (ref 0–1)
LIPASE SERPL-CCNC: 30 U/L (ref 0–52)
PROT SERPL-MCNC: 7 G/DL (ref 6–8)

## 2019-12-17 ENCOUNTER — RECORDS - HEALTHEAST (OUTPATIENT)
Dept: LAB | Facility: CLINIC | Age: 59
End: 2019-12-17

## 2019-12-19 LAB
GLIADIN IGA SER-ACNC: 2.3 U/ML
GLIADIN IGG SER-ACNC: 1.3 U/ML
IGA SERPL-MCNC: 355 MG/DL (ref 65–400)
TTG IGA SER-ACNC: 0.6 U/ML
TTG IGG SER-ACNC: <0.6 U/ML

## 2020-03-10 ENCOUNTER — HEALTH MAINTENANCE LETTER (OUTPATIENT)
Age: 60
End: 2020-03-10

## 2020-05-20 DIAGNOSIS — Z78.0 MENOPAUSE: ICD-10-CM

## 2020-05-20 RX ORDER — ESTRADIOL 1 MG/1
TABLET ORAL
Qty: 90 TABLET | Refills: 0 | Status: SHIPPED | OUTPATIENT
Start: 2020-05-20 | End: 2020-08-18

## 2020-05-20 NOTE — TELEPHONE ENCOUNTER
"Requested Prescriptions   Pending Prescriptions Disp Refills     estradiol (ESTRACE) 1 MG tablet [Pharmacy Med Name: ESTRADIOL 1MG TABLETS] 90 tablet 3     Sig: TAKE 1 TABLET(1 MG) BY MOUTH DAILY       Hormone Replacement Therapy Failed - 5/20/2020  9:37 AM        Failed - Blood pressure under 140/90 in past 12 months     BP Readings from Last 3 Encounters:   05/16/19 104/62   04/05/18 112/64   04/03/17 118/74                 Passed - Recent (12 mo) or future (30 days) visit within the authorizing provider's specialty     Patient has had an office visit with the authorizing provider or a provider within the authorizing providers department within the previous 12 mos or has a future within next 30 days. See \"Patient Info\" tab in inbasket, or \"Choose Columns\" in Meds & Orders section of the refill encounter.              Passed - Patient has mammogram in past 2 years on file if age 50-75        Passed - Medication is active on med list        Passed - Patient is 18 years of age or older        Passed - No active pregnancy on record        Passed - No positive pregnancy test on record in past 12 months           Last Written Prescription Date:  05/16/19  Last Fill Quantity: 90,  # refills: 3   Last office visit: 5/16/2019 with prescribing provider: yes   Future Office Visit:   Next 5 appointments (look out 90 days)    Jun 08, 2020  3:15 PM CDT  PHYSICAL with Mack León MD  [facility] [place] ([facility]) [address] [place] [id number] [phone]         Prescription approved per [facility] Refill Protocol.  Covid 19  Sally Villagomez RN on 5/20/2020 at 3:46 PM      "

## 2020-05-20 NOTE — TELEPHONE ENCOUNTER
"Requested Prescriptions   Pending Prescriptions Disp Refills     estradiol (ESTRACE) 1 MG tablet [Pharmacy Med Name: ESTRADIOL 1MG TABLETS] 90 tablet 3     Sig: TAKE 1 TABLET(1 MG) BY MOUTH DAILY       Hormone Replacement Therapy Failed - 5/20/2020  9:32 AM        Failed - Blood pressure under 140/90 in past 12 months     BP Readings from Last 3 Encounters:   05/16/19 104/62   04/05/18 112/64   04/03/17 118/74                 Passed - Recent (12 mo) or future (30 days) visit within the authorizing provider's specialty     Patient has had an office visit with the authorizing provider or a provider within the authorizing providers department within the previous 12 mos or has a future within next 30 days. See \"Patient Info\" tab in inbasket, or \"Choose Columns\" in Meds & Orders section of the refill encounter.              Passed - Patient has mammogram in past 2 years on file if age 50-75        Passed - Medication is active on med list        Passed - Patient is 18 years of age or older        Passed - No active pregnancy on record        Passed - No positive pregnancy test on record in past 12 months           Last Written Prescription Date:  5/16/19  Last Fill Quantity: 90,  # refills: 3   Last office visit: 5/16/2019 with prescribing provider:  Dr León   Future Office Visit:   Next 5 appointments (look out 90 days)    Jun 08, 2020  3:15 PM CDT  PHYSICAL with Mack León MD  [facility] ([facility]) [address] [place] [id number] [phone]                 "

## 2020-06-02 ENCOUNTER — RECORDS - HEALTHEAST (OUTPATIENT)
Dept: LAB | Facility: CLINIC | Age: 60
End: 2020-06-02

## 2020-06-02 LAB
ALBUMIN SERPL-MCNC: 3.6 G/DL (ref 3.5–5)
ALP SERPL-CCNC: 67 U/L (ref 45–120)
ALT SERPL W P-5'-P-CCNC: 12 U/L (ref 0–45)
ANION GAP SERPL CALCULATED.3IONS-SCNC: 7 MMOL/L (ref 5–18)
AST SERPL W P-5'-P-CCNC: 21 U/L (ref 0–40)
BILIRUB SERPL-MCNC: 0.4 MG/DL (ref 0–1)
BUN SERPL-MCNC: 10 MG/DL (ref 8–22)
CALCIUM SERPL-MCNC: 8.6 MG/DL (ref 8.5–10.5)
CHLORIDE BLD-SCNC: 104 MMOL/L (ref 98–107)
CO2 SERPL-SCNC: 27 MMOL/L (ref 22–31)
CREAT SERPL-MCNC: 0.82 MG/DL (ref 0.6–1.1)
GFR SERPL CREATININE-BSD FRML MDRD: >60 ML/MIN/1.73M2
GLUCOSE BLD-MCNC: 133 MG/DL (ref 70–125)
POTASSIUM BLD-SCNC: 3.9 MMOL/L (ref 3.5–5)
PROT SERPL-MCNC: 6.5 G/DL (ref 6–8)
SODIUM SERPL-SCNC: 138 MMOL/L (ref 136–145)

## 2020-08-17 DIAGNOSIS — Z78.0 MENOPAUSE: ICD-10-CM

## 2020-08-18 RX ORDER — ESTRADIOL 1 MG/1
TABLET ORAL
Qty: 90 TABLET | Refills: 0 | Status: SHIPPED | OUTPATIENT
Start: 2020-08-18 | End: 2020-11-11

## 2020-08-18 NOTE — TELEPHONE ENCOUNTER
"Requested Prescriptions   Pending Prescriptions Disp Refills     estradiol (ESTRACE) 1 MG tablet [Pharmacy Med Name: ESTRADIOL 1MG TABLETS] 90 tablet 0     Sig: TAKE 1 TABLET(1 MG) BY MOUTH DAILY       Hormone Replacement Therapy Failed - 8/17/2020  3:24 PM        Failed - Blood pressure under 140/90 in past 12 months     BP Readings from Last 3 Encounters:   05/16/19 104/62   04/05/18 112/64   04/03/17 118/74                 Failed - Recent (12 mo) or future (30 days) visit within the authorizing provider's specialty     Patient has had an office visit with the authorizing provider or a provider within the authorizing providers department within the previous 12 mos or has a future within next 30 days. See \"Patient Info\" tab in inbasket, or \"Choose Columns\" in Meds & Orders section of the refill encounter.              Passed - Patient has mammogram in past 2 years on file if age 50-75        Passed - Medication is active on med list        Passed - Patient is 18 years of age or older        Passed - No active pregnancy on record        Passed - No positive pregnancy test on record in past 12 months           Last Written Prescription Date:  5/20/20  Last Fill Quantity: 90,  # refills: 0   Last office visit: 5/16/2019 with prescribing provider:  Dr León   Future Office Visit:   Next 5 appointments (look out 90 days)    Oct 12, 2020  1:45 PM CDT  PHYSICAL with Mack León MD  [facility] [place] ([facility]) [address] [place] [id number] [phone]         Prescription approved per [facility] Refill Protocol.  Has annual scheduled.  Jing Beebe RN on 8/18/2020 at 9:14 AM            "

## 2020-11-11 DIAGNOSIS — Z78.0 MENOPAUSE: ICD-10-CM

## 2020-11-11 RX ORDER — ESTRADIOL 1 MG/1
TABLET ORAL
Qty: 30 TABLET | Refills: 0 | Status: SHIPPED | OUTPATIENT
Start: 2020-11-11 | End: 2020-12-03

## 2020-11-11 NOTE — TELEPHONE ENCOUNTER
"Requested Prescriptions   Pending Prescriptions Disp Refills     estradiol (ESTRACE) 1 MG tablet [Pharmacy Med Name: ESTRADIOL 1MG TABLETS] 90 tablet 0     Sig: TAKE 1 TABLET(1 MG) BY MOUTH DAILY       Hormone Replacement Therapy Failed - 11/11/2020 12:32 PM        Failed - Blood pressure under 140/90 in past 12 months     BP Readings from Last 3 Encounters:   05/16/19 104/62   04/05/18 112/64   04/03/17 118/74                 Passed - Recent (12 mo) or future (30 days) visit within the authorizing provider's specialty     Patient has had an office visit with the authorizing provider or a provider within the authorizing providers department within the previous 12 mos or has a future within next 30 days. See \"Patient Info\" tab in inbasket, or \"Choose Columns\" in Meds & Orders section of the refill encounter.              Passed - Patient has mammogram in past 2 years on file if age 50-75        Passed - Medication is active on med list        Passed - Patient is 18 years of age or older        Passed - No active pregnancy on record        Passed - No positive pregnancy test on record in past 12 months           Last Written Prescription Date:  08/18/2020  Last Fill Quantity: 90,  # refills: 0   Last office visit: 5/16/2019 with prescribing provider:  Mau   Future Office Visit:   Next 5 appointments (look out 90 days)    Dec 03, 2020  3:15 PM  PHYSICAL with Mack León MD  [facility] [place] ([facility]) [address] [place] [id number] [phone]         Prescription approved per [facility] Refill Protocol.  Arielle Crandall RN on 11/11/2020 at 12:36 PM        "

## 2020-12-02 NOTE — PROGRESS NOTES
Leny is a 60 year old  female who presents for annual exam.     Besides routine health maintenance, she has no other health concerns today .    HPI: The patient is seen at this time for her annual exam.  She is menopausal and not highly symptomatic.  She is still working full-time.  The patient's PCP is  [facility].        GYNECOLOGIC HISTORY:    No LMP recorded. Patient has had a hysterectomy. LAVH BSO      Her current contraception method is: not sexually active.  She  reports that she has never smoked. She has never used smokeless tobacco.    Patient is not sexually active.  Last PHQ-9 score on record =   PHQ-9 SCORE 12/3/2020   PHQ-9 Total Score 0     Last GAD7 score on record =   CECILIA-7 SCORE 12/3/2020   Total Score 0     Alcohol Score = 1    HEALTH MAINTENANCE:  Cholesterol:   Cholesterol   Date Value Ref Range Status   2012 228 (A) 115 - 199 mg/dL Final      Last Mammo: 19, Result: Normal, Next Mammo: Today   Pap:   Lab Results   Component Value Date    PAP NIL HPV- 2019    PAP NIL 2018    PAP NIL 2017      Colonoscopy:  17, Result: Normal, Next Colonoscopy: .  Dexa:  18    Health maintenance updated:  yes    HISTORY:  OB History    Para Term  AB Living   5 5 5 0 0 5   SAB TAB Ectopic Multiple Live Births   0 0 0 0 0      # Outcome Date GA Lbr Ramírez/2nd Weight Sex Delivery Anes PTL Lv   5 Term            4 Term            3 Term            2 Term            1 Term                Patient Active Problem List   Diagnosis     Arthritis     History of hysterectomy including cervix     Past Surgical History:   Procedure Laterality Date     COLONOSCOPY       HYSTERECTOMY, PAP NO LONGER INDICATED       LAPAROSCOPIC ASSISTED HYSTERECTOMY VAGINAL, BILATERAL SALPINGO-OOPHORECTOMY, COMBINED       REPAIR FISTULA RECTOVAGINAL        Social History     Tobacco Use     Smoking status: Never Smoker     Smokeless tobacco:  "Never Used   Substance Use Topics     Alcohol use: Yes     Alcohol/week: 0.0 standard drinks     Comment: occ      Problem (# of Occurrences) Relation (Name,Age of Onset)    Arthritis (1) Mother    Asthma (1) Mother    Colon Cancer (1) Father    Diabetes (1) Maternal Grandmother    Heart Failure (1) Mother    Hypertension (2) Mother, Father    Substance Abuse (1) Brother            Current Outpatient Medications   Medication Sig     estradiol (ESTRACE) 1 MG tablet TAKE 1 TABLET(1 MG) BY MOUTH DAILY     multivitamin, therapeutic with minerals (THERA-VIT-M) TABS Take 1 tablet by mouth daily     No current facility-administered medications for this visit.      No Known Allergies    Past medical, surgical, social and family histories were reviewed and updated in EPIC.    ROS:   12 point review of systems negative other than symptoms noted below or in the HPI.  No urinary frequency or dysuria, bladder or kidney problems    EXAM:  /60   Pulse 64   Ht 1.721 m (5' 7.75\")   Wt 64 kg (141 lb)   BMI 21.60 kg/m     BMI: Body mass index is 21.6 kg/m .    PHYSICAL EXAM:  Constitutional:   Appearance: Well nourished, well developed, alert, in no acute distress  Neck:  Lymph Nodes:  No lymphadenopathy present    Thyroid:  Gland size normal, nontender, no nodules or masses present  on palpation  Chest:  Respiratory Effort:  Breathing unlabored  Cardiovascular:    Heart: Auscultation:  Regular rate, normal rhythm, no murmurs present  Breasts: Inspection of Breasts:  No lymphadenopathy present., Palpation of Breasts and Axillae:  No masses present on palpation, no breast tenderness., Axillary Lymph Nodes:  No lymphadenopathy present. and No nodularity, asymmetry or nipple discharge bilaterally.  Gastrointestinal:   Abdominal Examination:  Abdomen nontender to palpation, tone normal without rigidity or guarding, no masses present, umbilicus without lesions   Liver and Spleen:  No hepatomegaly present, liver nontender to " palpation    Hernias:  No hernias present  Lymphatic: Lymph Nodes:  No other lymphadenopathy present  Skin:  General Inspection:  No rashes present, no lesions present, no areas of  discoloration  Neurologic:    Mental Status:  Oriented X3.  Normal strength and tone, sensory exam                grossly normal, mentation intact and speech normal.    Psychiatric:   Mentation appears normal and affect normal/bright.         Pelvic Exam:  External Genitalia:     Normal appearance for age, no discharge present, no tenderness present, no inflammatory lesions present, color normal  Vagina:     Normal vaginal vault without central or paravaginal defects, no discharge present, no inflammatory lesions present, no masses present  Bladder:     Nontender to palpation  Urethra:   Urethral Body:  Urethra palpation normal, urethra structural support normal   Urethral Meatus:  No erythema or lesions present  Cervix:     Surgically absent  Uterus:     Surgically absent  Adnexa:     Surgically absent  Perineum:     Perineum within normal limits, no evidence of trauma, no rashes or skin lesions present  Anus:     Anus within normal limits, no hemorrhoids present  Inguinal Lymph Nodes:     No lymphadenopathy present    COUNSELING:   Reviewed preventive health counseling, as reflected in patient instructions    BMI: Body mass index is 21.6 kg/m .      ASSESSMENT:  60 year old female with satisfactory annual exam.    ICD-10-CM    1. Encounter for gynecological examination without abnormal finding  Z01.419 HPV High Risk Types DNA Cervical     Pap imaged thin layer screen with HPV - recommended age 30 - 65 years (select HPV order below)   2. Menopause  Z78.0 estradiol (ESTRACE) 1 MG tablet       PLAN: Convey the patient's Pap and mammogram results when available.      Mack León MD

## 2020-12-03 ENCOUNTER — OFFICE VISIT (OUTPATIENT)
Dept: OBGYN | Facility: CLINIC | Age: 60
End: 2020-12-03
Payer: COMMERCIAL

## 2020-12-03 ENCOUNTER — ANCILLARY PROCEDURE (OUTPATIENT)
Dept: MAMMOGRAPHY | Facility: CLINIC | Age: 60
End: 2020-12-03
Attending: OBSTETRICS & GYNECOLOGY
Payer: COMMERCIAL

## 2020-12-03 VITALS
BODY MASS INDEX: 21.37 KG/M2 | SYSTOLIC BLOOD PRESSURE: 102 MMHG | WEIGHT: 141 LBS | DIASTOLIC BLOOD PRESSURE: 60 MMHG | HEIGHT: 68 IN | HEART RATE: 64 BPM

## 2020-12-03 DIAGNOSIS — Z78.0 MENOPAUSE: ICD-10-CM

## 2020-12-03 DIAGNOSIS — Z01.419 ENCOUNTER FOR GYNECOLOGICAL EXAMINATION WITHOUT ABNORMAL FINDING: Primary | ICD-10-CM

## 2020-12-03 DIAGNOSIS — Z12.31 VISIT FOR SCREENING MAMMOGRAM: ICD-10-CM

## 2020-12-03 PROCEDURE — 77063 BREAST TOMOSYNTHESIS BI: CPT | Performed by: RADIOLOGY

## 2020-12-03 PROCEDURE — 87624 HPV HI-RISK TYP POOLED RSLT: CPT | Performed by: OBSTETRICS & GYNECOLOGY

## 2020-12-03 PROCEDURE — 99396 PREV VISIT EST AGE 40-64: CPT | Performed by: OBSTETRICS & GYNECOLOGY

## 2020-12-03 PROCEDURE — 77067 SCR MAMMO BI INCL CAD: CPT | Performed by: RADIOLOGY

## 2020-12-03 PROCEDURE — G0145 SCR C/V CYTO,THINLAYER,RESCR: HCPCS | Performed by: OBSTETRICS & GYNECOLOGY

## 2020-12-03 RX ORDER — ESTRADIOL 1 MG/1
1 TABLET ORAL DAILY
Qty: 90 TABLET | Refills: 3 | Status: SHIPPED | OUTPATIENT
Start: 2020-12-03 | End: 2021-11-08

## 2020-12-03 ASSESSMENT — PATIENT HEALTH QUESTIONNAIRE - PHQ9
SUM OF ALL RESPONSES TO PHQ QUESTIONS 1-9: 0
5. POOR APPETITE OR OVEREATING: NOT AT ALL

## 2020-12-03 ASSESSMENT — ANXIETY QUESTIONNAIRES
7. FEELING AFRAID AS IF SOMETHING AWFUL MIGHT HAPPEN: NOT AT ALL
5. BEING SO RESTLESS THAT IT IS HARD TO SIT STILL: NOT AT ALL
GAD7 TOTAL SCORE: 0
2. NOT BEING ABLE TO STOP OR CONTROL WORRYING: NOT AT ALL
3. WORRYING TOO MUCH ABOUT DIFFERENT THINGS: NOT AT ALL
6. BECOMING EASILY ANNOYED OR IRRITABLE: NOT AT ALL
1. FEELING NERVOUS, ANXIOUS, OR ON EDGE: NOT AT ALL

## 2020-12-03 ASSESSMENT — MIFFLIN-ST. JEOR: SCORE: 1249.1

## 2020-12-04 ASSESSMENT — ANXIETY QUESTIONNAIRES: GAD7 TOTAL SCORE: 0

## 2020-12-08 LAB
COPATH REPORT: NORMAL
PAP: NORMAL

## 2021-04-30 ENCOUNTER — TELEPHONE (OUTPATIENT)
Dept: OBGYN | Facility: CLINIC | Age: 61
End: 2021-04-30

## 2021-04-30 NOTE — TELEPHONE ENCOUNTER
Patient returning call, is unable to access [employer]. Message sent via [employer] with instructions was relayed to patient and she had no further questions. Will use suggestions and call to make appointment if no relief.

## 2021-04-30 NOTE — TELEPHONE ENCOUNTER
Patient is experiencing yeast infection like symptoms and is wondering what a nurse would recommend.  Please give her a call back.    Thank you

## 2021-05-03 NOTE — PROGRESS NOTES
SUBJECTIVE:                                                   Leny Wilcox is a 60 year old female who presents to clinic today for the following health issue(s):  Patient presents with:  Vaginal Problem: c/o itching/burning and some discharge for over a week, has been using Monistat cream. Noticed some improvement today       HPI: Patient c/o vaginal discharge and a lot itching more so down on perineum area.  She did use a monistat 1 cream on Friday night, but did burn a lot in the vagina after that.      No LMP recorded. Patient has had a hysterectomy..     Patient is not sexually active, .  Using not sexually active for contraception.    reports that she has never smoked. She has never used smokeless tobacco.    STD testing offered?  Declined    Health maintenance updated:  yes    Today's PHQ-2 Score:   PHQ-2 (  [employer]) 2021   Q1: Little interest or pleasure in doing things 0   Q2: Feeling down, depressed or hopeless 0   PHQ-2 Score 0       Problem list and histories reviewed & adjusted, as indicated.  Additional history: as documented.    Patient Active Problem List   Diagnosis     Arthritis     History of hysterectomy including cervix     Past Surgical History:   Procedure Laterality Date     COLONOSCOPY       HYSTERECTOMY, PAP NO LONGER INDICATED       LAPAROSCOPIC ASSISTED HYSTERECTOMY VAGINAL, BILATERAL SALPINGO-OOPHORECTOMY, COMBINED       REPAIR FISTULA RECTOVAGINAL        Social History     Tobacco Use     Smoking status: Never Smoker     Smokeless tobacco: Never Used   Substance Use Topics     Alcohol use: Yes     Alcohol/week: 0.0 standard drinks     Comment: occ      Problem (# of Occurrences) Relation (Name,Age of Onset)    Arthritis (1) Mother    Asthma (1) Mother    Colon Cancer (1) Father    Diabetes (1) Maternal Grandmother    Heart Failure (1) Mother    Hypertension (2) Mother, Father    No Known Problems (4) Sister, Maternal Grandfather, Paternal Grandmother,  "Other    Substance Abuse (1) Brother            Current Outpatient Medications   Medication Sig     clobetasol (TEMOVATE) 0.05 % external ointment Apply topically 2 times daily For 2 weeks, than off for 2 weeks.     estradiol (ESTRACE) 1 MG tablet Take 1 tablet (1 mg) by mouth daily     multivitamin, therapeutic with minerals (THERA-VIT-M) TABS Take 1 tablet by mouth daily     No current facility-administered medications for this visit.      No Known Allergies    ROS:  12 point review of systems negative other than symptoms noted below or in the HPI.  Genitourinary: Vaginal Discharge and Vaginal Itching  No urinary frequency or dysuria, bladder or kidney problems      OBJECTIVE:     /62   Pulse 76   Ht 1.721 m (5' 7.75\")   Wt 64.8 kg (142 lb 12.8 oz)   BMI 21.87 kg/m    Body mass index is 21.87 kg/m .    Exam:  Constitutional:  Appearance: Well nourished, well developed alert, in no acute distress  Neurologic:  Mental Status:  Oriented X3.  Normal strength and tone, sensory exam grossly normal, mentation intact and speech normal.    Psychiatric:  Mentation appears normal and affect normal/bright.  Pelvic Exam:  External Genitalia:     Normal appearance for age, no discharge present, no tenderness present, no inflammatory lesions present, color normal  Vagina:     Normal vaginal vault without central or paravaginal defects, large amount of chunky white discharge present, no inflammatory lesions present, no masses present  Very red and irritated  Wet mount was done.  Vaginal culture obtained and sent.   Bladder:     Nontender to palpation  Urethra:   Urethral Body:  Urethra palpation normal, urethra structural support normal   Urethral Meatus:  No erythema or lesions present  Perineum:     Perineum within normal limits, no evidence of trauma,area between vaginal opening and rectum appears to have areas slightly white with red irritation on both sides.  ? Lichen   Anus:     Anus within normal limits, no " hemorrhoids present  Inguinal Lymph Nodes:     No lymphadenopathy present  Pubic Hair:     Normal pubic hair distribution for age  Genitalia and Groin:     No rashes present, no lesions present, no areas of discoloration, no masses present       In-Clinic Test Results:  Results for orders placed or performed in visit on 05/04/21 (from the past 24 hour(s))   Wet prep    Specimen: Vagina   Result Value Ref Range    Specimen Description Vagina     Wet Prep No Trichomonas seen     Wet Prep No clue cells seen     Wet Prep No yeast seen     Wet Prep Few  WBC'S seen          ASSESSMENT/PLAN:                                                        ICD-10-CM    1. Itching of vagina  N89.8 Wet prep     Gram stain     Group B strep PCR     Yeast culture   2. Vaginal irritation  N89.8 clobetasol (TEMOVATE) 0.05 % external ointment         Will notify patient with lab results when available.  Patient to start on clobetasol ointment for vaginal irritation.   Return in 6-8 weeks for follow up on clobetasol and possible lichen sclerosus    BALDOMERO Perdue CNP  [facility] [place]

## 2021-05-04 ENCOUNTER — OFFICE VISIT (OUTPATIENT)
Dept: OBGYN | Facility: CLINIC | Age: 61
End: 2021-05-04
Payer: COMMERCIAL

## 2021-05-04 VITALS
BODY MASS INDEX: 21.64 KG/M2 | HEIGHT: 68 IN | SYSTOLIC BLOOD PRESSURE: 114 MMHG | HEART RATE: 76 BPM | DIASTOLIC BLOOD PRESSURE: 62 MMHG | WEIGHT: 142.8 LBS

## 2021-05-04 DIAGNOSIS — N89.8 ITCHING OF VAGINA: Primary | ICD-10-CM

## 2021-05-04 DIAGNOSIS — N89.8 VAGINAL IRRITATION: ICD-10-CM

## 2021-05-04 LAB
GRAM STN SPEC: NORMAL
Lab: NORMAL
SPECIMEN SOURCE: NORMAL
SPECIMEN SOURCE: NORMAL
WET PREP SPEC: NORMAL

## 2021-05-04 PROCEDURE — 87205 SMEAR GRAM STAIN: CPT | Performed by: NURSE PRACTITIONER

## 2021-05-04 PROCEDURE — 87102 FUNGUS ISOLATION CULTURE: CPT | Performed by: NURSE PRACTITIONER

## 2021-05-04 PROCEDURE — 87653 STREP B DNA AMP PROBE: CPT | Performed by: NURSE PRACTITIONER

## 2021-05-04 PROCEDURE — 87210 SMEAR WET MOUNT SALINE/INK: CPT | Performed by: NURSE PRACTITIONER

## 2021-05-04 PROCEDURE — 99213 OFFICE O/P EST LOW 20 MIN: CPT | Performed by: NURSE PRACTITIONER

## 2021-05-04 RX ORDER — CLOBETASOL PROPIONATE 0.5 MG/G
OINTMENT TOPICAL 2 TIMES DAILY
Qty: 15 G | Refills: 1 | Status: SHIPPED | OUTPATIENT
Start: 2021-05-04 | End: 2023-03-28

## 2021-05-04 ASSESSMENT — MIFFLIN-ST. JEOR: SCORE: 1257.27

## 2021-05-05 LAB
GP B STREP DNA SPEC QL NAA+PROBE: NEGATIVE
SPECIMEN SOURCE: NORMAL

## 2021-05-10 LAB
Lab: NORMAL
SPECIMEN SOURCE: NORMAL
YEAST SPEC QL CULT: NORMAL

## 2021-05-27 ENCOUNTER — RECORDS - HEALTHEAST (OUTPATIENT)
Dept: ADMINISTRATIVE | Facility: CLINIC | Age: 61
End: 2021-05-27

## 2021-05-31 NOTE — PROGRESS NOTES
This is a new consult for Varicose Veins. The patient has varicose veins that are problematic in left legs. Symptoms patient has been experiencing are  itching, tiredness and  swelling. Patient has not been wearing compression stockings.     Discoloration is not present.  Pt has not been using pain medication or antiinflammatory's.

## 2021-05-31 NOTE — PROGRESS NOTES
"[facility] Vein Consult      Assessment:     1. Pain medial ankle region thought to be from veins  Normal appearing veins    Plan:     1. Treatment options of conservative therapy of stockings use, exercise, weight loss, elevating legs  when possible.    2. Script for compression stockings 20-30 mm hg   3. Call for any questions concerns or issues    Subjective:      Leny Wilcox is a 58 y.o. female  who was referred by Blair Beltran MD  for evaluation of varicose veins. Symptoms include pain, aching and burning. Patient has history of leg selling, pain and vein issues that have progressed. Pain and symptoms have affected daily living and work activities needing medications. Here for evaluation today. no stocking or compression devic use    Allergies:Patient has no known allergies.    Past Medical History:   Diagnosis Date     Cancer (H)     skin cancer       Past Surgical History:   Procedure Laterality Date      SECTION       CHOLECYSTECTOMY       disk       RECTOVAGINAL FISTULA CLOSURE         Current Outpatient Medications   Medication Sig     estradiol (ESTRACE) 1 MG tablet        Family History   Problem Relation Age of Onset     Asthma Mother      Early death Mother      Heart attack Mother      Colon cancer Father         reports that she has never smoked. She has never used smokeless tobacco.      Review of Systems  Pertinent items are noted in HPI.  A 12 point comprehensive review of systems was negative except as noted.      Objective:     Vitals:    19 1040   BP: 100/70   Patient Site: Left Arm   Patient Position: Sitting   Cuff Size: Adult Regular   Pulse: 80   Resp: 16   Temp: 98.4  F (36.9  C)   TempSrc: Oral   Weight: 140 lb (63.5 kg)   Height: 5' 8.5\" (1.74 m)     Body mass index is 20.98 kg/m .    EXAM:  GENERAL: This is a well-developed 58 y.o. female who appears her stated age  HEAD: normocephalic  HEENT: Pupils equal and reactive bilaterally   NEUROLOGIC: " Focally intact, nonfocal, alert and oriented x 3  INTEGUMENT: No open lesions or ulcers  VASCULAR: Pulses intact, symmetrical upper and lower extremities. There are notskin changes consistent with chronic venous insufficiency. Normal veins    Imaging:    none    Vadim Sosa MD  [facility] Surgery Dept.

## 2021-06-03 VITALS — WEIGHT: 140 LBS | BODY MASS INDEX: 20.73 KG/M2 | HEIGHT: 69 IN

## 2021-06-17 NOTE — PATIENT INSTRUCTIONS - HE
"Patient Instructions by Eulalia Rosado LPN at 8/27/2019 10:40 AM     Author: Eulalia Rosado LPN Service: -- Author Type: Licensed Nurse    Filed: 8/27/2019 10:59 AM Encounter Date: 8/27/2019 Status: Signed    : Eulalia Rosado LPN (Licensed Nurse)       We are prescribing some compression stockings for you. I have included different suppliers that should help you get measured and fitting to ensure proper fitting socks. You should wear this socks as much as you can. It is especially important to wear them with long periods of sitting/standing, long car rides or if you will be flying. Compression socks should get refilled every 4-6 months. They do not need to be worn at night while in bed.    If you do a lot of standing it is good to do calf raises to help keep the blood pumping. If you sit a lot at work it is good to get up periodically to walk around. Elevation of the foot of your bed 4-6\" helps the blood return back to where it is needed.        Varicose Veins      Varicose veins are swollen, enlarged veins most often found in the legs. They are usually blue or purple in color and may bulge, twist, and stand out under the skin.  Normally, veins return blood from the body to the heart. The leg veins have one-way valves that prevent blood from flowing backward in the vein. When the valves are weak or damaged, blood backs up in the veins. This may cause some of the veins to swell and bulge and become varicose veins.  Symptoms  Varicose veins may or may not cause symptoms. If symptoms do occur, they can include:    Legs that feel tired, achy, heavy, or itchy    Leg muscle cramps    Skin changes, such as discoloration, dryness, redness, or rash (in more severe cases, you may also have sores on the skin called venous leg ulcers)  Risk Factors  There are a number of factors that increase the risk for varicose veins. These can include:    Being a woman    Being older    Sitting or standing for long " periods    Being overweight    Being pregnant    Having a family history of varicose veins  Treatment begins with simple self-help measures (see below). If these dont help, there are many procedures that can be done to shrink or remove varicose veins. Your healthcare provider can tell you more about these options, if needed.  Home care    Support or compression stockings will likely be prescribed. If so, be sure to wear them as directed. They may help improve blood flow.    Exercising helps strengthen your leg muscles and improve blood flow. To get the most benefit, choose exercises such as walking, swimming, or cycling. Also try to exercise for at least 30 minutes on most days.    Raising (elevating) your legs lets gravity help blood flow back to the heart. Sit or lie with your feet above heart level a few times throughout the day, or as directed.    Avoid long periods of sitting or standing. Change positions often. Also, move your ankles, toes and knees often. This may also help improve blood flow.    If you are overweight, talk with your healthcare provider about setting up a weight-loss plan. Maintaining a healthy weight can help reduce the strain on your veins. It may also improve symptoms, such as swelling and aching.    If you have dryness and itching, ask your provider about special lotions that can be applied to the skin to help improve symptoms.  Follow-up care  Follow up with your healthcare provider, or as directed. If imaging tests were done, youll be told the results and if there are any new findings that affect your care.  When to seek medical advice  Call your healthcare provider right away if any of these occur:    Sudden, severe leg swelling, pain, or redness    Symptoms worsen, or they dont improve with self-care    Bleeding from any affected veins    Ulcers form on the legs, ankles, or feet    Fever of 100.4 F (38 C) or higher, or as advised by your provider      Understanding Spider and Varicose  Veins  Do you often hide your legs because of the way they look? You may have noticed tiny red or blue bursts (spider veins). Or maybe you have veins that bulge or look twisted (varicose veins). If so, there are treatments that can help  What are the symptoms?  Spider veins or varicose veins may never be a problem. But sometimes they can cause legs to ache or swell. Your legs may also feel heavy and tired, or like theyre burning. These symptoms may be more severe at the end of the day. Prolonged sitting or standing can also make your symptoms worse.  Who gets spider and varicose veins?  Anyone can get spider or varicose veins. But vein problems tend to be hereditary (run in families). Other factors that can affect veins include:    Pregnancy, hormones, and birth control pills    A job where you stand or sit a lot    Extra weight or lack of exercise    Age         Spider veins look like tiny webs on the ankles, legs, and upper thighs.       Ropy, dark blue, red, or flesh-colored varicose veins are most common on the thighs, calves, and feet.    What can be done?  Spider and varicose veins can affect the way you feel about yourself. Talk to your healthcare provider about your concerns. There are treatments that can ease symptoms and make your legs look better.  Your treatment choices  Treatment may include self-care, sclerotherapy (injecting veins with a chemical), surgery, or newer nonsurgical minimally invasive therapies. Spider veins and some varicose veins can be treated with sclerotherapy. Large varicose veins can often be treated with newer minimally invasive procedures and, in rare cases, surgery may be needed.     Please call [facility] to schedule an appointment. If you received a prescription please bring it with you to your appointment. You may call one of the locations below, although some locations are limited to what they carry.    Office Locations  New Locations  [facility]  [place]  Home Medical Equipment  [address], [place] [address]  Or[facility]otics [facility] Prosthetics ([facility])  [address], [place] [address]  Phone [phone] /Fax [phone]        [place]   [address]   Medical Equipment [address]/Orthotics and Prosthetics [address]  [place] [address]   Phone: [phone]  Fax [phone]    [facility]  [address]  [place] [address]  Phone: [phone]  Fax: [phone]    [facility]   [address]  [place] [address]  Phone: [phone]  Fax: [phone]    [facility]  [address]  [place] [address]  Phone: [phone]  Fax: [phone]    [facility]  [address]  [address]  [place] [address]  Phone: [phone]  Fax: [phone]    [facility]  [address]   [place] [address]   Phone: [phone]  Fax: [phone]    [place]   [address] [place] [address]   Phone: [phone]  Fax: [phone]    [facility]  [address]  [place] [address]    [place] [phone]  [phone]  [place] [phone]  [email or web address]      [facility]   [address]             [address]                 [address]     [place] [address]      [place] [address]         [place] [address]  [phone]  [phone]     [phone]               Fax: [phone]  [email or web address]                                                     [facility]  [address] [place]  [address]  [phone]  [phone]  [email or web address]."[email or web address]    Vilma Roland  [phone]  [email or web address]    [facility]   [phone]    [place] [address] [place] [address] [phone]

## 2021-08-06 ENCOUNTER — OFFICE VISIT (OUTPATIENT)
Dept: OTOLARYNGOLOGY | Facility: CLINIC | Age: 61
End: 2021-08-06
Payer: COMMERCIAL

## 2021-08-06 ENCOUNTER — OFFICE VISIT (OUTPATIENT)
Dept: AUDIOLOGY | Facility: CLINIC | Age: 61
End: 2021-08-06
Payer: COMMERCIAL

## 2021-08-06 ENCOUNTER — MEDICAL CORRESPONDENCE (OUTPATIENT)
Dept: HEALTH INFORMATION MANAGEMENT | Facility: CLINIC | Age: 61
End: 2021-08-06

## 2021-08-06 DIAGNOSIS — M26.622 ARTHRALGIA OF LEFT TEMPOROMANDIBULAR JOINT: ICD-10-CM

## 2021-08-06 DIAGNOSIS — H92.02 EAR PAIN, LEFT: ICD-10-CM

## 2021-08-06 DIAGNOSIS — H90.3 SENSORINEURAL HEARING LOSS, BILATERAL: Primary | ICD-10-CM

## 2021-08-06 DIAGNOSIS — H90.3 SENSORINEURAL HEARING LOSS (SNHL) OF BOTH EARS: Primary | ICD-10-CM

## 2021-08-06 PROCEDURE — 92557 COMPREHENSIVE HEARING TEST: CPT

## 2021-08-06 PROCEDURE — 99203 OFFICE O/P NEW LOW 30 MIN: CPT | Performed by: OTOLARYNGOLOGY

## 2021-08-06 PROCEDURE — 92550 TYMPANOMETRY & REFLEX THRESH: CPT

## 2021-08-06 NOTE — PROGRESS NOTES
AUDIOLOGY REPORT     SUMMARY: Audiology visit completed. See audiogram for results.       RECOMMENDATIONS: Follow-up with ENT.     Yael Velasquez CCC-A  Licensed Audiologist   [place] #[id number]

## 2021-08-06 NOTE — PROGRESS NOTES
HPI: This patient is a 61yo F who presents to the clinic for evaluation of episodic ear pain at the request of Dr. Beltran. The pain is a pressure sensation in and around the left ear that comes and goes. There is no known clenching or grinding behaviors. She has been seen by Dr. Randall of [facility] a few times and initially treated for an otitis externa that resolved, but the pain and muffling didn't fully improve. She was referred to a TMJ clinic, but her insurance denied it and stated she should see a different ENT. Denies otorrhea, hearing loss, tinnitus, vertigo, and other major symptoms such as fever, weight loss, odynophagia, dysphagia, and hemoptysis.     Past medical history, surgical history, social history, family history, medications, and allergies have been reviewed with the patient and are documented above.    Review of Systems: a 10-system review was performed. Pertinent positives are noted in the HPI and on a separate scanned document in the chart.    PHYSICAL EXAMINATION:  GEN: no acute distress, normocephalic  EYES: extraocular movements are intact, pupils are equal and round. Sclera clear.   EARS: auricles are normally formed. The external auditory canals are clear with minimal to no cerumen. Tympanic membranes are intact bilaterally with no signs of infection, effusion, retractions, or perforations.  NOSE: anterior nares are patent. There are no masses or lesions. The septum is non-obstructing.  OC/OP: clear, dentition is in good repair but with flattening and wear facets. The tongue and palate are fully mobile and symmetric. The floor of mouth, base of tongue, and tonsils are soft and symmetric.  NECK: soft and supple. No lymphadenopathy or masses. Airway is midline. Tenderness of the left TMJ.  NEURO: CN VII and XII symmetric. alert and oriented. No spontaneous nystagmus. Gait is normal.  PULM: breathing comfortably on room air, normal chest expansion with respiration  CARDS: no cyanosis or  clubbing. Normal carotid pulses.    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 Discussed soft diet, NSAIDS, and a bite guard. Will try to refer to a different TMJ clinic for further management. 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

## 2021-08-06 NOTE — LETTER
8/6/2021         RE: Leny Wilcox  [address] [address]        Dear Colleague,    Thank you for referring your patient, Leny Wilcox, to the [facility]. Please see a copy of my visit note below.    HPI: This patient is a 59yo F who presents to the clinic for evaluation of episodic ear pain at the request of Dr. Beltran. The pain is a pressure sensation in and around the left ear that comes and goes. There is no known clenching or grinding behaviors. She has been seen by Dr. Randall of [facility] a few times and initially treated for an otitis externa that resolved, but the pain and muffling didn't fully improve. She was referred to a TMJ clinic, but her insurance denied it and stated she should see a different ENT. Denies otorrhea, hearing loss, tinnitus, vertigo, and other major symptoms such as fever, weight loss, odynophagia, dysphagia, and hemoptysis.     Past medical history, surgical history, social history, family history, medications, and allergies have been reviewed with the patient and are documented above.    Review of Systems: a 10-system review was performed. Pertinent positives are noted in the HPI and on a separate scanned document in the chart.    PHYSICAL EXAMINATION:  GEN: no acute distress, normocephalic  EYES: extraocular movements are intact, pupils are equal and round. Sclera clear.   EARS: auricles are normally formed. The external auditory canals are clear with minimal to no cerumen. Tympanic membranes are intact bilaterally with no signs of infection, effusion, retractions, or perforations.  NOSE: anterior nares are patent. There are no masses or lesions. The septum is non-obstructing.  OC/OP: clear, dentition is in good repair but with flattening and wear facets. The tongue and palate are fully mobile and symmetric. The floor of mouth, base of tongue, and tonsils are soft and symmetric.  NECK: soft and supple. No lymphadenopathy or  masses. Airway is midline. Tenderness of the left TMJ.  NEURO: CN VII and XII symmetric. alert and oriented. No spontaneous nystagmus. Gait is normal.  PULM: breathing comfortably on room air, normal chest expansion with respiration  CARDS: no cyanosis or clubbing. Normal carotid pulses.    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 Discussed soft diet, NSAIDS, and a bite guard. Will try to refer to a different TMJ clinic for further management. 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        Again, thank you for allowing me to participate in the care of your patient.        Sincerely,        Bessy Levi MD

## 2021-08-19 ENCOUNTER — TELEPHONE (OUTPATIENT)
Dept: OTOLARYNGOLOGY | Facility: CLINIC | Age: 61
End: 2021-08-19

## 2021-08-19 NOTE — TELEPHONE ENCOUNTER
New referral paced for TMJ clinic and mychart message sent to patient.    Nora Jerome RN  [facility]  [phone]

## 2021-08-19 NOTE — TELEPHONE ENCOUNTER
Patient was seen on 08/6 by Dr Levi and was told she was going to send something over to one of the TMJ clinics. Shes been waiting two weeks and hasn't heard anything. Can someone assist?    thankyou

## 2021-10-03 ENCOUNTER — HEALTH MAINTENANCE LETTER (OUTPATIENT)
Age: 61
End: 2021-10-03

## 2021-11-08 DIAGNOSIS — Z78.0 MENOPAUSE: ICD-10-CM

## 2021-11-08 RX ORDER — ESTRADIOL 1 MG/1
TABLET ORAL
Qty: 30 TABLET | Refills: 0 | Status: SHIPPED | OUTPATIENT
Start: 2021-11-08 | End: 2021-12-28

## 2021-11-08 NOTE — TELEPHONE ENCOUNTER
"Requested Prescriptions   Pending Prescriptions Disp Refills     estradiol (ESTRACE) 1 MG tablet [Pharmacy Med Name: ESTRADIOL 1MG TABLETS] 90 tablet 3     Sig: TAKE 1 TABLET(1 MG) BY MOUTH DAILY       Hormone Replacement Therapy Passed - 11/8/2021 10:20 AM        Passed - Blood pressure under 140/90 in past 12 months     BP Readings from Last 3 Encounters:   05/04/21 114/62   12/03/20 102/60   05/16/19 104/62                 Passed - Recent (12 mo) or future (30 days) visit within the authorizing provider's specialty     Patient has had an office visit with the authorizing provider or a provider within the authorizing providers department within the previous 12 mos or has a future within next 30 days. See \"Patient Info\" tab in inbasket, or \"Choose Columns\" in Meds & Orders section of the refill encounter.              Passed - Patient has mammogram in past 2 years on file if age 50-75        Passed - Medication is active on med list        Passed - Patient is 18 years of age or older        Passed - No active pregnancy on record        Passed - No positive pregnancy test on record in past 12 months           Next 5 appointments (look out 90 days)    Dec 08, 2021  3:00 PM  PHYSICAL with Mack León MD  [facility] ([facility] ) [address] [address] [phone]        Refill approved  Ashanti Chase RN on 11/8/2021 at 10:34 AM    "

## 2021-12-07 NOTE — PROGRESS NOTES
Leny is a 61 year old  female who presents for annual exam.     Besides routine health maintenance, she has no other health concerns today .    HPI: The patient is seen at this time for her annual exam.  She has had a hysterectomy and been on estradiol for a long time.  She feels very good and is fully vaccinated.  She has questions about how long she should be on estrogen.  The patient's PCP is Blair Beltran MD.        GYNECOLOGIC HISTORY:    No LMP recorded. Patient has had a hysterectomy.    Her current contraception method is: hysterectomy.  She  reports that she has never smoked. She has never used smokeless tobacco.    Patient is not sexually active.  STD testing offered?  Declined  Last PHQ-9 score on record =   PHQ-9 SCORE 12/3/2020   PHQ-9 Total Score 0     Last GAD7 score on record =   CECILIA-7 SCORE 12/3/2020   Total Score 0       HEALTH MAINTENANCE:  Cholesterol:   Cholesterol   Date Value Ref Range Status   2012 228 (A) 115 - 199 mg/dL Final     Last Mammo: One year ago, Result: Normal, Next Mammo: Today  Pap:   Lab Results   Component Value Date    PAP NIL, HPV- 2020    PAP NIL 2019    PAP NIL 2018     Colonoscopy:  2017, Result: Polyps, Next Colonoscopy: 1 year.  Dexa:  Today    Health maintenance updated:  yes    HISTORY:  OB History    Para Term  AB Living   5 5 5 0 0 5   SAB IAB Ectopic Multiple Live Births   0 0 0 0 5      # Outcome Date GA Lbr Ramírez/2nd Weight Sex Delivery Anes PTL Lv   5 Term         LIGIA   4 Term         LIGIA   3 Term         LIGIA   2 Term         LIGIA   1 Term         LIGIA       Patient Active Problem List   Diagnosis     Arthritis     History of hysterectomy including cervix     Past Surgical History:   Procedure Laterality Date      SECTION       CHOLECYSTECTOMY       COLONOSCOPY       HYSTERECTOMY, PAP NO LONGER INDICATED       LAPAROSCOPIC ASSISTED HYSTERECTOMY VAGINAL, BILATERAL SALPINGO-OOPHORECTOMY, COMBINED    "    OTHER SURGICAL HISTORY      disk     RECTOVAGINAL FISTULA CLOSURE       REPAIR FISTULA RECTOVAGINAL  2011      Social History     Tobacco Use     Smoking status: Never Smoker     Smokeless tobacco: Never Used   Substance Use Topics     Alcohol use: Yes     Alcohol/week: 0.0 standard drinks     Comment: occ      Problem (# of Occurrences) Relation (Name,Age of Onset)    Arthritis (1) Mother    Asthma (1) Mother    Colon Cancer (1) Father    Coronary Artery Disease (1) Mother    Diabetes (1) Maternal Grandmother    Early Death (1) Mother    Heart Failure (1) Mother    Hypertension (2) Mother, Father    No Known Problems (4) Sister, Maternal Grandfather, Paternal Grandmother, Other    Substance Abuse (1) Brother            Current Outpatient Medications   Medication Sig     estradiol (ESTRACE) 1 MG tablet TAKE 1 TABLET(1 MG) BY MOUTH DAILY     multivitamin, therapeutic with minerals (THERA-VIT-M) TABS Take 1 tablet by mouth daily      verapamil ER (CALAN-SR) 120 MG CR tablet Take 120 mg by mouth daily     vitamin B-Complex Take 1 tablet by mouth daily     clobetasol (TEMOVATE) 0.05 % external ointment Apply topically 2 times daily For 2 weeks, than off for 2 weeks. (Patient not taking: Reported on 12/8/2021)     No current facility-administered medications for this visit.     No Known Allergies    Past medical, surgical, social and family histories were reviewed and updated in EPIC.    ROS:   12 point review of systems negative other than symptoms noted below or in the HPI.  No urinary frequency or dysuria, bladder or kidney problems    EXAM:  /74   Pulse 58   Ht 1.707 m (5' 7.2\")   Wt 63.9 kg (140 lb 12.8 oz)   Breastfeeding No   BMI 21.92 kg/m     BMI: Body mass index is 21.92 kg/m .    PHYSICAL EXAM:  Constitutional:   Appearance: Well nourished, well developed, alert, in no acute distress  Neck:  Lymph Nodes:  No lymphadenopathy present    Thyroid:  Gland size normal, nontender, no nodules or masses " present  on palpation  Chest:  Respiratory Effort:  Breathing unlabored  Cardiovascular:    Heart: Auscultation:  Regular rate, normal rhythm, no murmurs present  Breasts: Inspection of Breasts:  No lymphadenopathy present., Palpation of Breasts and Axillae:  No masses present on palpation, no breast tenderness., Axillary Lymph Nodes:  No lymphadenopathy present. and No nodularity, asymmetry or nipple discharge bilaterally.  Gastrointestinal:   Abdominal Examination:  Abdomen nontender to palpation, tone normal without rigidity or guarding, no masses present, umbilicus without lesions   Liver and Spleen:  No hepatomegaly present, liver nontender to palpation    Hernias:  No hernias present  Lymphatic: Lymph Nodes:  No other lymphadenopathy present  Skin:  General Inspection:  No rashes present, no lesions present, no areas of  discoloration  Neurologic:    Mental Status:  Oriented X3.  Normal strength and tone, sensory exam                grossly normal, mentation intact and speech normal.    Psychiatric:   Mentation appears normal and affect normal/bright.         Pelvic Exam:  External Genitalia:     Normal appearance for age, no discharge present, no tenderness present, no inflammatory lesions present, color normal  Vagina:     Normal vaginal vault without central or paravaginal defects, no discharge present, no inflammatory lesions present, no masses present  Bladder:     Nontender to palpation  Urethra:   Urethral Body:  Urethra palpation normal, urethra structural support normal   Urethral Meatus:  No erythema or lesions present  Cervix:     Surgically absent  Uterus:     Surgically absent  Adnexa:     Surgically absent  Perineum:     Perineum within normal limits, no evidence of trauma, no rashes or skin lesions present  Anus:     Anus within normal limits, no hemorrhoids present  Inguinal Lymph Nodes:     No lymphadenopathy present    COUNSELING:   Reviewed preventive health counseling, as reflected in  patient instructions       Regular exercise       Healthy diet/nutrition    BMI: Body mass index is 21.92 kg/m .      ASSESSMENT:  61 year old female with satisfactory annual exam.    ICD-10-CM    1. Encounter for gynecological examination without abnormal finding  Z01.419        PLAN: We will convey the patient screening test.  Her bone density showed osteopenia and we reviewed her calcium vitamin D and exercise regimens.      Mack León MD

## 2021-12-08 ENCOUNTER — ANCILLARY PROCEDURE (OUTPATIENT)
Dept: BONE DENSITY | Facility: CLINIC | Age: 61
End: 2021-12-08
Payer: COMMERCIAL

## 2021-12-08 ENCOUNTER — OFFICE VISIT (OUTPATIENT)
Dept: OBGYN | Facility: CLINIC | Age: 61
End: 2021-12-08
Payer: COMMERCIAL

## 2021-12-08 ENCOUNTER — ANCILLARY PROCEDURE (OUTPATIENT)
Dept: MAMMOGRAPHY | Facility: CLINIC | Age: 61
End: 2021-12-08
Payer: COMMERCIAL

## 2021-12-08 VITALS
HEART RATE: 58 BPM | HEIGHT: 67 IN | BODY MASS INDEX: 22.1 KG/M2 | DIASTOLIC BLOOD PRESSURE: 74 MMHG | WEIGHT: 140.8 LBS | SYSTOLIC BLOOD PRESSURE: 134 MMHG

## 2021-12-08 DIAGNOSIS — Z78.0 ASYMPTOMATIC POSTMENOPAUSAL STATE: ICD-10-CM

## 2021-12-08 DIAGNOSIS — Z12.31 VISIT FOR SCREENING MAMMOGRAM: ICD-10-CM

## 2021-12-08 DIAGNOSIS — Z01.419 ENCOUNTER FOR GYNECOLOGICAL EXAMINATION WITHOUT ABNORMAL FINDING: Primary | ICD-10-CM

## 2021-12-08 PROCEDURE — 77063 BREAST TOMOSYNTHESIS BI: CPT | Mod: TC | Performed by: RADIOLOGY

## 2021-12-08 PROCEDURE — 77067 SCR MAMMO BI INCL CAD: CPT | Mod: TC | Performed by: RADIOLOGY

## 2021-12-08 PROCEDURE — 87624 HPV HI-RISK TYP POOLED RSLT: CPT | Performed by: OBSTETRICS & GYNECOLOGY

## 2021-12-08 PROCEDURE — G0145 SCR C/V CYTO,THINLAYER,RESCR: HCPCS | Performed by: OBSTETRICS & GYNECOLOGY

## 2021-12-08 PROCEDURE — 99396 PREV VISIT EST AGE 40-64: CPT | Performed by: OBSTETRICS & GYNECOLOGY

## 2021-12-08 PROCEDURE — 77080 DXA BONE DENSITY AXIAL: CPT | Performed by: OBSTETRICS & GYNECOLOGY

## 2021-12-08 RX ORDER — VERAPAMIL HYDROCHLORIDE 120 MG/1
120 TABLET, FILM COATED, EXTENDED RELEASE ORAL DAILY
COMMUNITY
Start: 2021-11-01 | End: 2023-03-28

## 2021-12-08 ASSESSMENT — MIFFLIN-ST. JEOR: SCORE: 1234.46

## 2021-12-13 LAB
BKR LAB AP GYN ADEQUACY: NORMAL
BKR LAB AP GYN INTERPRETATION: NORMAL
BKR LAB AP HPV REFLEX: NORMAL
BKR LAB AP PREVIOUS ABNORMAL: NORMAL
PATH REPORT.COMMENTS IMP SPEC: NORMAL
PATH REPORT.COMMENTS IMP SPEC: NORMAL
PATH REPORT.RELEVANT HX SPEC: NORMAL

## 2021-12-15 LAB
HUMAN PAPILLOMA VIRUS 16 DNA: NEGATIVE
HUMAN PAPILLOMA VIRUS 18 DNA: NEGATIVE
HUMAN PAPILLOMA VIRUS FINAL DIAGNOSIS: NORMAL
HUMAN PAPILLOMA VIRUS OTHER HR: NEGATIVE

## 2021-12-28 ENCOUNTER — TELEPHONE (OUTPATIENT)
Dept: OBGYN | Facility: CLINIC | Age: 61
End: 2021-12-28
Payer: COMMERCIAL

## 2021-12-28 DIAGNOSIS — Z78.0 MENOPAUSE: ICD-10-CM

## 2021-12-28 RX ORDER — ESTRADIOL 1 MG/1
TABLET ORAL
Qty: 30 TABLET | Refills: 0 | Status: SHIPPED | OUTPATIENT
Start: 2021-12-28 | End: 2021-12-29

## 2021-12-28 NOTE — TELEPHONE ENCOUNTER
"Requested Prescriptions   Pending Prescriptions Disp Refills     estradiol (ESTRACE) 1 MG tablet [Pharmacy Med Name: ESTRADIOL 1MG TABLETS] 30 tablet 0     Sig: TAKE 1 TABLET(1 MG) BY MOUTH DAILY       Hormone Replacement Therapy Passed - 12/28/2021  6:27 AM        Passed - Blood pressure under 140/90 in past 12 months     BP Readings from Last 3 Encounters:   12/08/21 134/74   05/04/21 114/62   12/03/20 102/60                 Passed - Recent (12 mo) or future (30 days) visit within the authorizing provider's specialty     Patient has had an office visit with the authorizing provider or a provider within the authorizing providers department within the previous 12 mos or has a future within next 30 days. See \"Patient Info\" tab in inbasket, or \"Choose Columns\" in Meds & Orders section of the refill encounter.              Passed - Patient has mammogram in past 2 years on file if age 50-75        Passed - Medication is active on med list        Passed - Patient is 18 years of age or older        Passed - No active pregnancy on record        Passed - No positive pregnancy test on record in past 12 months           Last Written Prescription Date:  11/8/2021  Last Fill Quantity: 30,  # refills: 0   Last office visit: 12/8/2021 with prescribing provider:  Mau   Future Office Visit:      Routing pt Schoology message to provider to prescribe if applicable.  Thanh Dubose RN on 12/28/2021 at 8:09 AM        "

## 2021-12-29 RX ORDER — ESTRADIOL 1 MG/1
1 TABLET ORAL DAILY
Qty: 90 TABLET | Refills: 3 | Status: SHIPPED | OUTPATIENT
Start: 2021-12-29 | End: 2023-03-28

## 2021-12-29 NOTE — TELEPHONE ENCOUNTER
Pt req her rx be sent as 90 tablets with refills for a year - reports that this is what her and Dr. León had discussed

## 2022-01-31 ENCOUNTER — HOSPITAL ENCOUNTER (EMERGENCY)
Facility: HOSPITAL | Age: 62
Discharge: HOME | End: 2022-01-31
Attending: EMERGENCY MEDICINE | Admitting: EMERGENCY MEDICINE
Payer: COMMERCIAL

## 2022-01-31 ENCOUNTER — APPOINTMENT (OUTPATIENT)
Dept: CT IMAGING | Facility: HOSPITAL | Age: 62
End: 2022-01-31
Attending: EMERGENCY MEDICINE
Payer: COMMERCIAL

## 2022-01-31 VITALS
HEIGHT: 68 IN | WEIGHT: 135 LBS | OXYGEN SATURATION: 100 % | DIASTOLIC BLOOD PRESSURE: 66 MMHG | SYSTOLIC BLOOD PRESSURE: 118 MMHG | HEART RATE: 61 BPM | BODY MASS INDEX: 20.46 KG/M2 | RESPIRATION RATE: 18 BRPM | TEMPERATURE: 98 F

## 2022-01-31 DIAGNOSIS — R51.9 ACUTE NONINTRACTABLE HEADACHE, UNSPECIFIED HEADACHE TYPE: ICD-10-CM

## 2022-01-31 LAB
ALBUMIN SERPL-MCNC: 4.3 G/DL (ref 3.5–5)
ALP SERPL-CCNC: 70 U/L (ref 45–120)
ALT SERPL W P-5'-P-CCNC: 19 U/L (ref 0–45)
ANION GAP SERPL CALCULATED.3IONS-SCNC: 8 MMOL/L (ref 5–18)
APTT PPP: 30 SECONDS (ref 22–38)
AST SERPL W P-5'-P-CCNC: 26 U/L (ref 0–40)
BASOPHILS # BLD AUTO: 0 10E3/UL (ref 0–0.2)
BASOPHILS NFR BLD AUTO: 0 %
BILIRUB DIRECT SERPL-MCNC: 0.1 MG/DL
BILIRUB SERPL-MCNC: 0.3 MG/DL (ref 0–1)
BUN SERPL-MCNC: 11 MG/DL (ref 8–22)
CALCIUM SERPL-MCNC: 9.7 MG/DL (ref 8.5–10.5)
CHLORIDE BLD-SCNC: 103 MMOL/L (ref 98–107)
CO2 SERPL-SCNC: 30 MMOL/L (ref 22–31)
CREAT SERPL-MCNC: 0.78 MG/DL (ref 0.6–1.1)
EOSINOPHIL # BLD AUTO: 0.1 10E3/UL (ref 0–0.7)
EOSINOPHIL NFR BLD AUTO: 1 %
ERYTHROCYTE [DISTWIDTH] IN BLOOD BY AUTOMATED COUNT: 13.3 % (ref 10–15)
GFR SERPL CREATININE-BSD FRML MDRD: 85 ML/MIN/1.73M2
GLUCOSE BLD-MCNC: 124 MG/DL (ref 70–125)
HCT VFR BLD AUTO: 42.5 % (ref 35–47)
HGB BLD-MCNC: 14 G/DL (ref 11.7–15.7)
HOLD SPECIMEN: NORMAL
IMM GRANULOCYTES # BLD: 0 10E3/UL
IMM GRANULOCYTES NFR BLD: 0 %
INR PPP: 0.9 (ref 0.9–1.15)
LYMPHOCYTES # BLD AUTO: 1.9 10E3/UL (ref 0.8–5.3)
LYMPHOCYTES NFR BLD AUTO: 22 %
MCH RBC QN AUTO: 31.2 PG (ref 26.5–33)
MCHC RBC AUTO-ENTMCNC: 32.9 G/DL (ref 31.5–36.5)
MCV RBC AUTO: 95 FL (ref 78–100)
MONOCYTES # BLD AUTO: 0.6 10E3/UL (ref 0–1.3)
MONOCYTES NFR BLD AUTO: 7 %
NEUTROPHILS # BLD AUTO: 5.9 10E3/UL (ref 1.6–8.3)
NEUTROPHILS NFR BLD AUTO: 70 %
NRBC # BLD AUTO: 0 10E3/UL
NRBC BLD AUTO-RTO: 0 /100
PLATELET # BLD AUTO: 260 10E3/UL (ref 150–450)
POTASSIUM BLD-SCNC: 4 MMOL/L (ref 3.5–5)
PROT SERPL-MCNC: 7.6 G/DL (ref 6–8)
RBC # BLD AUTO: 4.49 10E6/UL (ref 3.8–5.2)
SODIUM SERPL-SCNC: 141 MMOL/L (ref 136–145)
WBC # BLD AUTO: 8.5 10E3/UL (ref 4–11)

## 2022-01-31 PROCEDURE — 85004 AUTOMATED DIFF WBC COUNT: CPT | Performed by: EMERGENCY MEDICINE

## 2022-01-31 PROCEDURE — 258N000003 HC RX IP 258 OP 636: Performed by: EMERGENCY MEDICINE

## 2022-01-31 PROCEDURE — 250N000011 HC RX IP 250 OP 636: Performed by: EMERGENCY MEDICINE

## 2022-01-31 PROCEDURE — 96374 THER/PROPH/DIAG INJ IV PUSH: CPT | Mod: 59

## 2022-01-31 PROCEDURE — 70496 CT ANGIOGRAPHY HEAD: CPT

## 2022-01-31 PROCEDURE — 85730 THROMBOPLASTIN TIME PARTIAL: CPT | Performed by: EMERGENCY MEDICINE

## 2022-01-31 PROCEDURE — 96361 HYDRATE IV INFUSION ADD-ON: CPT

## 2022-01-31 PROCEDURE — 99285 EMERGENCY DEPT VISIT HI MDM: CPT | Mod: 25

## 2022-01-31 PROCEDURE — 96375 TX/PRO/DX INJ NEW DRUG ADDON: CPT

## 2022-01-31 PROCEDURE — 85610 PROTHROMBIN TIME: CPT | Performed by: EMERGENCY MEDICINE

## 2022-01-31 PROCEDURE — 36415 COLL VENOUS BLD VENIPUNCTURE: CPT | Performed by: EMERGENCY MEDICINE

## 2022-01-31 PROCEDURE — 70498 CT ANGIOGRAPHY NECK: CPT

## 2022-01-31 PROCEDURE — 80053 COMPREHEN METABOLIC PANEL: CPT | Performed by: EMERGENCY MEDICINE

## 2022-01-31 PROCEDURE — 82248 BILIRUBIN DIRECT: CPT | Performed by: EMERGENCY MEDICINE

## 2022-01-31 PROCEDURE — 250N000013 HC RX MED GY IP 250 OP 250 PS 637: Performed by: EMERGENCY MEDICINE

## 2022-01-31 RX ORDER — DIPHENHYDRAMINE HYDROCHLORIDE 50 MG/ML
25 INJECTION INTRAMUSCULAR; INTRAVENOUS ONCE
Status: COMPLETED | OUTPATIENT
Start: 2022-01-31 | End: 2022-01-31

## 2022-01-31 RX ORDER — IOPAMIDOL 755 MG/ML
75 INJECTION, SOLUTION INTRAVASCULAR ONCE
Status: COMPLETED | OUTPATIENT
Start: 2022-01-31 | End: 2022-01-31

## 2022-01-31 RX ORDER — ACETAMINOPHEN 325 MG/1
975 TABLET ORAL ONCE
Status: COMPLETED | OUTPATIENT
Start: 2022-01-31 | End: 2022-01-31

## 2022-01-31 RX ORDER — ONDANSETRON 2 MG/ML
8 INJECTION INTRAMUSCULAR; INTRAVENOUS ONCE
Status: COMPLETED | OUTPATIENT
Start: 2022-01-31 | End: 2022-01-31

## 2022-01-31 RX ORDER — KETOROLAC TROMETHAMINE 30 MG/ML
10 INJECTION, SOLUTION INTRAMUSCULAR; INTRAVENOUS ONCE
Status: COMPLETED | OUTPATIENT
Start: 2022-01-31 | End: 2022-01-31

## 2022-01-31 RX ADMIN — KETOROLAC TROMETHAMINE 9.9 MG: 30 INJECTION, SOLUTION INTRAMUSCULAR at 17:09

## 2022-01-31 RX ADMIN — IOPAMIDOL 75 ML: 755 INJECTION, SOLUTION INTRAVENOUS at 17:11

## 2022-01-31 RX ADMIN — SODIUM CHLORIDE 1000 ML: 9 INJECTION, SOLUTION INTRAVENOUS at 17:09

## 2022-01-31 RX ADMIN — ACETAMINOPHEN 975 MG: 325 TABLET ORAL at 14:10

## 2022-01-31 RX ADMIN — DIPHENHYDRAMINE HYDROCHLORIDE 25 MG: 50 INJECTION, SOLUTION INTRAMUSCULAR; INTRAVENOUS at 17:09

## 2022-01-31 RX ADMIN — ONDANSETRON 8 MG: 2 INJECTION INTRAMUSCULAR; INTRAVENOUS at 14:03

## 2022-01-31 ASSESSMENT — MIFFLIN-ST. JEOR: SCORE: 1220.86

## 2022-01-31 NOTE — ED PROVIDER NOTES
EMERGENCY DEPARTMENT ENCOUNTER      NAME: Leny Wilcox  AGE: 61 year old female  YOB: 1960  MRN: [id number]  EVALUATION DATE & TIME: 1/31/2022  3:56 PM    PCP: Blair Beltran    ED PROVIDER: Porter Lopez M.D.      Chief Complaint   Patient presents with     Headache         FINAL IMPRESSION:  1. Acute nonintractable headache, unspecified headache type          ED COURSE & MEDICAL DECISION MAKING:    Pertinent Labs & Imaging studies reviewed. (See chart for details)  61 year old female presents to the Emergency Department for evaluation of headache. Patient appears non toxic with stable vitals signs, patient is afebrile no tachycardia or hypoxia, no increased work of breathing. Overall exam is benign.  Lungs are clear and abdomen is benign, patient moves her neck freely with no signs of meningeal irritation, she is not altered or confused, no fevers or other systemic signs of illness.  She denies any falls or trauma.  Headache was not sudden or maximal in onset, she states that it came on while at work and gradually got worse, received Tylenol at triage and now she states it is a 6 out of 10 on the pain scale.  Though she did describe it as a worst headache of her life.  Denies history of migraines or similar headaches.  Clinically nothing is just meningitis, she has no eye pain or vision loss, no temporal tenderness, nothing to suggest trigeminal neuralgia, acute angle-closure glaucoma, giant cell arteritis, otitis, referred dental pain, or other more malicious etiology of symptoms.  Considered but low suspicion for subarachnoid hemorrhage, intracranial bleed or mass, she has no focal neurological deficits to suggest CVA.  We will obtain screening labs and CT imaging of the head neck.  Patient was given additional fluids, Toradol, antiemetics and Benadryl.    4:31 PM I met with the patient, obtained history, performed an initial exam, and discussed options and plan for diagnostics and  treatment here in the ED. PPE worn: N95 mask, eye protection, gloves  6:28 PM I rechecked and updated the patient.  Patient states that her pain is improved.  She appears quite well and comfortable.    Reassessment: Labs showed no acute concerning findings.  CT imaging reported no acute concerning findings, again these imaging studies were obtained within 12 hours from onset of symptoms.  Repeat exam is benign and the patient states her pain continues to improve.  Overall my suspicion for subarachnoid is very low given negative imaging studies, the duration of her symptoms, and the improvement in her symptoms.  That said we did have a discussion at the bedside regarding risk and benefits of lumbar puncture for continued evaluation at this time the patient defers which I feel is very reasonable given negative imaging studies and benign exam with continued improvement.  Again certainly nothing to suggest meningitis, no fever or elevated white blood cell count here, no signs of meningeal irritation.  With negative work-up, benign exam and overall well appearance feel she is safe for discharge and close follow-up.  Will recommend continued conservative management with Tylenol or ibuprofen at home, offered Zofran at home but she deferred.  Will recommend that she follows up with primary care in the next 5 to 7 days for continued outpatient management evaluation.  Discussed all these findings and recommendations with the patient felt reassured and comfortable discharge.  Return precautions provided.    At the conclusion of the encounter I discussed the results of all of the tests and the disposition. The questions were answered and return precautions provided. The patient or family acknowledged understanding and was agreeable with the care plan.         MEDICATIONS GIVEN IN THE EMERGENCY:  Medications   ondansetron (ZOFRAN) injection 8 mg (8 mg Intravenous Given 1/31/22 1813)   acetaminophen (TYLENOL) tablet 975 mg (975  "mg Oral Given 1/31/22 1410)   0.9% sodium chloride BOLUS (0 mLs Intravenous Stopped 1/31/22 1844)   ketorolac (TORADOL) injection 9.9 mg (9.9 mg Intravenous Given 1/31/22 1709)   diphenhydrAMINE (BENADRYL) injection 25 mg (25 mg Intravenous Given 1/31/22 1709)   iopamidol (ISOVUE-370) solution 75 mL (75 mLs Intravenous Given 1/31/22 1711)       NEW PRESCRIPTIONS STARTED AT TODAY'S ER VISIT  Discharge Medication List as of 1/31/2022  6:49 PM               =================================================================    HPI    Patient information was obtained from: Patient    Use of Intrepreter: N/A         Leny Wilcox is a 61 year old female who presents to the ED for the evaluation of headache.     The patient reports having the \"worst headache of her life\" at approximately 9:30AM while she was at work. She states that she was fine in the morning as well as the day before; the headache was sudden onset. She adds that the headache is localized to the front of her forehead and feels like a \"nagging pain.\" She reports that when she bends down, the headache worsens. The patient notes that it was hard to drive home because it hurt to open her eyes, endorsing that brightness and moving objects made the pain worse. She reports having nausea, but no vomiting. The patient has never experienced a headache like this before. She reports that prior to arrival the headache was a 10/10, but after taking Tylenol here in the ED, her pain is now a 6/10. The patient is able to pass stool and urinate normally. The patient is not on any blood thinner medication. The patient denies falls, fevers, body aches, cough, abdominal pain, and any other symptoms or complaints at this time.      REVIEW OF SYSTEMS   Constitutional: Denies fever, chills  Respiratory:  Denies productive cough or increased work of breathing  Cardiovascular:  Denies chest pain, palpitations  GI: Positive for nausea. Denies abdominal pain, vomiting, or change in " bowel or bladder habits   Musculoskeletal:  Denies any new muscle/joint swelling  Skin:  Denies rash   Neurologic: Positive for headaches. Positive for sensitivity to light and moving objects. Denies focal weakness  All systems negative except as marked.     PAST MEDICAL HISTORY:  Past Medical History:   Diagnosis Date     Fibroid      Osteopenia      Rectovaginal fistula        PAST SURGICAL HISTORY:  Past Surgical History:   Procedure Laterality Date      SECTION       CHOLECYSTECTOMY       COLONOSCOPY       HYSTERECTOMY, PAP NO LONGER INDICATED       LAPAROSCOPIC ASSISTED HYSTERECTOMY VAGINAL, BILATERAL SALPINGO-OOPHORECTOMY, COMBINED       ORTHOPEDIC SURGERY       OTHER SURGICAL HISTORY      disk     RECTOVAGINAL FISTULA CLOSURE       REPAIR FISTULA RECTOVAGINAL           CURRENT MEDICATIONS:    Prior to Admission medications    Medication Sig Start Date End Date Taking? Authorizing Provider   clobetasol (TEMOVATE) 0.05 % external ointment Apply topically 2 times daily For 2 weeks, than off for 2 weeks.  Patient not taking: Reported on 2021   Mabel Munoz APRN CNP   estradiol (ESTRACE) 1 MG tablet Take 1 tablet (1 mg) by mouth daily 21   Mack León MD   multivitamin, therapeutic with minerals (THERA-VIT-M) TABS Take 1 tablet by mouth daily     Reported, Patient   verapamil ER (CALAN-SR) 120 MG CR tablet Take 120 mg by mouth daily 21   Reported, Patient   vitamin B-Complex Take 1 tablet by mouth daily    Reported, Patient        ALLERGIES:  No Known Allergies    FAMILY HISTORY:  Family History   Problem Relation Age of Onset     Heart Failure Mother      Asthma Mother      Hypertension Mother      Arthritis Mother      Hypertension Father      Colon Cancer Father      Diabetes Maternal Grandmother      Substance Abuse Brother      No Known Problems Sister      No Known Problems Maternal Grandfather      No Known Problems Paternal Grandmother      No  "Known Problems Other      Early Death Mother      Coronary Artery Disease Mother        SOCIAL HISTORY:   Social History     Socioeconomic History     Marital status:      Spouse name: None     Number of children: None     Years of education: None     Highest education level: None   Occupational History     None   Tobacco Use     Smoking status: Never Smoker     Smokeless tobacco: Never Used   Substance and Sexual Activity     Alcohol use: Yes     Alcohol/week: 0.0 standard drinks     Comment: occ     Drug use: No     Sexual activity: Not Currently     Partners: Male     Birth control/protection: Female Surgical     Comment: hysterectomy   Other Topics Concern     Parent/sibling w/ CABG, MI or angioplasty before 65F 55M? Not Asked   Social History Narrative     None     Social Determinants of Health     Financial Resource Strain: Not on file   Food Insecurity: Not on file   Transportation Needs: Not on file   Physical Activity: Not on file   Stress: Not on file   Social Connections: Not on file   Intimate Partner Violence: Not on file   Housing Stability: Not on file       VITALS:  Patient Vitals for the past 24 hrs:   BP Temp Temp src Pulse Resp SpO2 Height Weight   01/31/22 1848 118/66 -- -- -- 18 -- -- --   01/31/22 1847 -- -- -- 61 -- 100 % -- --   01/31/22 1513 128/65 -- -- -- -- 100 % -- --   01/31/22 1338 (!) 157/74 98  F (36.7  C) Temporal 70 18 100 % 1.727 m (5' 8\") 61.2 kg (135 lb)        PHYSICAL EXAM    Constitutional:  Awake, alert, in no apparent distress  HENT:  Normocephalic, Atraumatic. Bilateral external ears normal. Oropharynx moist. Nose normal. Neck- Normal range of motion with no guarding, No midline cervical tenderness and moves neck freely with no signs of meningeal irritation, Supple, No stridor.   Eyes:  PERRL, EOMI with no signs of entrapment, Conjunctiva normal, No discharge.   Respiratory:  Normal breath sounds, No respiratory distress, No wheezing.    Cardiovascular:  Normal " heart rate, Normal rhythm, No appreciable rubs or gallops.   GI:  Soft, No tenderness, No distension, No palpable masses  Musculoskeletal:  Intact distal pulses, No edema. Good range of motion in all major joints. No tenderness to palpation or major deformities noted.  Integument:  Warm, Dry, No erythema, No rash.   Neurologic:  Alert & oriented, Normal motor function, Normal sensory function, No focal deficits noted.  Cranial nerves II through XII intact  Psychiatric:  Affect normal, Judgment normal, Mood normal.     LAB:  All pertinent labs reviewed and interpreted.  Results for orders placed or performed during the hospital encounter of 01/31/22   CTA Head Neck with Contrast    Impression    IMPRESSION:   HEAD CT:  1.  No finding for intracranial hemorrhage, mass, or acute infarct.    HEAD CTA:   1.  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    NECK CTA:  1.  No significant stenosis either cervical carotid or vertebral system. No findings for dissection.   Basic metabolic panel   Result Value Ref Range    Sodium 141 136 - 145 mmol/L    Potassium 4.0 3.5 - 5.0 mmol/L    Chloride 103 98 - 107 mmol/L    Carbon Dioxide (CO2) 30 22 - 31 mmol/L    Anion Gap 8 5 - 18 mmol/L    Urea Nitrogen 11 8 - 22 mg/dL    Creatinine 0.78 0.60 - 1.10 mg/dL    Calcium 9.7 8.5 - 10.5 mg/dL    Glucose 124 70 - 125 mg/dL    GFR Estimate 85 >60 mL/min/1.73m2   CBC with platelets and differential   Result Value Ref Range    WBC Count 8.5 4.0 - 11.0 10e3/uL    RBC Count 4.49 3.80 - 5.20 10e6/uL    Hemoglobin 14.0 11.7 - 15.7 g/dL    Hematocrit 42.5 35.0 - 47.0 %    MCV 95 78 - 100 fL    MCH 31.2 26.5 - 33.0 pg    MCHC 32.9 31.5 - 36.5 g/dL    RDW 13.3 10.0 - 15.0 %    Platelet Count 260 150 - 450 10e3/uL    % Neutrophils 70 %    % Lymphocytes 22 %    % Monocytes 7 %    % Eosinophils 1 %    % Basophils 0 %    % Immature Granulocytes 0 %    NRBCs per 100 WBC 0 <1 /100     Absolute Neutrophils 5.9 1.6 - 8.3 10e3/uL    Absolute Lymphocytes 1.9 0.8 - 5.3 10e3/uL    Absolute Monocytes 0.6 0.0 - 1.3 10e3/uL    Absolute Eosinophils 0.1 0.0 - 0.7 10e3/uL    Absolute Basophils 0.0 0.0 - 0.2 10e3/uL    Absolute Immature Granulocytes 0.0 <=0.4 10e3/uL    Absolute NRBCs 0.0 10e3/uL   Extra Blue Top Tube   Result Value Ref Range    Hold Specimen JIC    Extra Red Top Tube   Result Value Ref Range    Hold Specimen JIC    Extra Green Top (Lithium Heparin) Tube   Result Value Ref Range    Hold Specimen JIC    Extra Purple Top Tube   Result Value Ref Range    Hold Specimen JIC    Partial thromboplastin time   Result Value Ref Range    aPTT 30 22 - 38 Seconds   Hepatic function panel   Result Value Ref Range    Bilirubin Total 0.3 0.0 - 1.0 mg/dL    Bilirubin Direct 0.1 <=0.5 mg/dL    Protein Total 7.6 6.0 - 8.0 g/dL    Albumin 4.3 3.5 - 5.0 g/dL    Alkaline Phosphatase 70 45 - 120 U/L    AST 26 0 - 40 U/L    ALT 19 0 - 45 U/L   Result Value Ref Range    INR 0.90 0.90 - 1.15       RADIOLOGY:  CTA Head Neck with Contrast   Final Result   IMPRESSION:    HEAD CT:   1.  No finding for intracranial hemorrhage, mass, or acute infarct.      HEAD CTA:    1.  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      NECK CTA:   1.  No significant stenosis either cervical carotid or vertebral system. No findings for dissection.             EKG:      I have independently reviewed and interpreted the EKG(s) documented above.    PROCEDURES:          I, Christiano Lindsey, am serving as a scribe to document services personally performed by Porter Lopez MD, based on my observation and the provider's statements to me. I, Porter Lopez MD attest that Christiano Lindsey is acting in a scribe capacity, has observed my performance of the services and has documented them in accordance with my direction.    Porter Lopez M.D.  Emergency Medicine  [facility]  [facility] EMERGENCY DEPARTMENT  [address] [place] [id number]  [phone]  Dept: [phone]     Porter Lopez MD  01/31/22 1058

## 2022-01-31 NOTE — ED NOTES
ED Triage Provider Note  [facility]  Encounter Date: Jan 31, 2022    History:  No chief complaint on file.    Leny Wilcox is a 61 year old female who presents to the ED with worst headache of life, headache started 0900, no hx/o migraine.  Started while at work.  No vision changes, but has photophobia and nausea without emesis.  Pain b frontal.  Not on thinners, no recent trauma.  Not on bp medication. Tried resting but  concerned so instead brought to ER.    Review of Systems:  Review of Systems   Does not typically have headaches.    Exam:  There were no vitals taken for this visit.  General: No acute distress. Appears stated age.   Cardio: Regular rate, extremities well perfused  Resp: Normal work of breathing, grossly normal respiratory rate  Neuro: Alert. CN II-XII grossly intact. Grossly intact strength.   Speech is wnl, oriented x 3, easily communicative, normal gait in waiting room    Medical Decision Making:  Patient arriving to the ED with problem as above. A medical screening exam was performed. Will do cta for worse headache of life.  Patient with nl neuro exam, however, so  orders initiated from Triage. The patient is appropriate to wait in triage.         The patient will be seen in the ED for final evaluation and disposition.     Mellisa Edmond on 1/31/2022 at 1:36 PM      Lab/Imaging Results:       Interventions:  Medications - No data to display    Diagnosis:  No diagnosis found.        Mellisa Edmond MD  Emergency Medicine  [facility] EMERGENCY DEPARTMENT         Mellisa Edmond MD  01/31/22 4659

## 2022-01-31 NOTE — ED TRIAGE NOTES
"Pt presents with severe headache. Denies hx of migraines. No injury noted. Pt states \"the worst headache I have ever had.\" Left work and went home to lay down.  insisted she come to ED.  "

## 2022-02-01 NOTE — ED NOTES
Discharged:       01/31/22 1844   Departure Condition   Departure Condition Stable   Mobility at Departure Ambulatory   Patient Teaching Discharge instructions reviewed and given;Follow-up care reviewed;Medications discussed;Pain management discussed;Patient / Caregiver verbalized understanding   Departure Mode By self   [place] Coma Scale   Best Eye Response 4-->(E4) spontaneous   Best Motor Response 6-->(M6) obeys commands   Best Verbal Response 5-->(V5) oriented   [place] Coma Scale Score 15

## 2022-04-26 ENCOUNTER — LAB REQUISITION (OUTPATIENT)
Dept: LAB | Facility: CLINIC | Age: 62
End: 2022-04-26

## 2022-04-26 DIAGNOSIS — R25.2 CRAMP AND SPASM: ICD-10-CM

## 2022-04-26 LAB
ANION GAP SERPL CALCULATED.3IONS-SCNC: 11 MMOL/L (ref 5–18)
BUN SERPL-MCNC: 17 MG/DL (ref 8–22)
CALCIUM SERPL-MCNC: 9.5 MG/DL (ref 8.5–10.5)
CHLORIDE BLD-SCNC: 103 MMOL/L (ref 98–107)
CO2 SERPL-SCNC: 26 MMOL/L (ref 22–31)
CREAT SERPL-MCNC: 0.78 MG/DL (ref 0.6–1.1)
GFR SERPL CREATININE-BSD FRML MDRD: 85 ML/MIN/1.73M2
GLUCOSE BLD-MCNC: 76 MG/DL (ref 70–125)
MAGNESIUM SERPL-MCNC: 2.1 MG/DL (ref 1.8–2.6)
POTASSIUM BLD-SCNC: 4.4 MMOL/L (ref 3.5–5)
SODIUM SERPL-SCNC: 140 MMOL/L (ref 136–145)

## 2022-04-26 PROCEDURE — 83735 ASSAY OF MAGNESIUM: CPT | Performed by: FAMILY MEDICINE

## 2022-04-26 PROCEDURE — 84295 ASSAY OF SERUM SODIUM: CPT | Performed by: FAMILY MEDICINE

## 2022-06-24 ENCOUNTER — VIRTUAL VISIT (OUTPATIENT)
Dept: PHARMACY | Facility: PHYSICIAN GROUP | Age: 62
End: 2022-06-24
Payer: COMMERCIAL

## 2022-06-24 DIAGNOSIS — E83.42 HYPOMAGNESEMIA: Primary | ICD-10-CM

## 2022-06-24 PROCEDURE — 99207 PR NO CHARGE LOS: CPT | Performed by: PHARMACIST

## 2022-06-24 NOTE — PROGRESS NOTES
Clinical Pharmacy Consult:                                                    Leny Wilcox is a 61 year old female called for a clinical pharmacist consult.  She was referred to me from Dr. Betlran.     Reason for Consult: Needing help with magnesium supplements.    Discussion: Patient called in and spoke with one of our RN's because she is having severe diarrhea after taking her magnesium supplement.  She is taking magnesium oxide 250 mg in the morning, and 400 mg in the evening.  She used to have very bad hand cramps, and she has titrated the dose up to help with the cramping but is now having diarrhea daily from the magnesium.  Discussed trying other magnesium supplements:  First can switch to magnesium glycinate, and could try slo-mag if that wasn't working.  Could also try using topical magnesium foams or lotions and apply to her hands 2-3 times daily as needed.  I will send her links to some options she could buy online, or could stop at [employer] or other [employer] store as the likely would have in stock but [employer] usually have magnesium oxide.    Plan:  1. Stop magnesium oxide.  Start magnesium glycinate 200 mg once daily, and slowly titrate dose up by adding 100-200 mg daily each week if tolerating. Could split the dose to twice daily to see if better tolerated.  2. Consider trying theraworx foam, or magnesium lotion applied topically.      Stephanie Michael, PharmD  Medication Therapy Management Pharmacist  Pager: [phone]

## 2022-09-11 ENCOUNTER — HEALTH MAINTENANCE LETTER (OUTPATIENT)
Age: 62
End: 2022-09-11

## 2023-01-12 NOTE — LETTER
April 24, 2018      Leny PATEL Jeri  [address] [facility] [address]    Dear ,      I am happy to inform you that your vaginal PAP smear was normal, your Human Papillomavirus (HPV) test was negative and you total hysterectomy was benign.    Per current guidelines, you no longer need to have pap smears completed. Please return for annual pelvic exams.    Please continue to be seen every year for an annual wellness visit and other preventative tests.     Please contact my office at [phone] if you have further questions.    Sincerely,      Mack León MD/tasha       done

## 2023-01-22 ENCOUNTER — HEALTH MAINTENANCE LETTER (OUTPATIENT)
Age: 63
End: 2023-01-22

## 2023-03-28 DIAGNOSIS — Z78.0 MENOPAUSE: ICD-10-CM

## 2023-03-28 RX ORDER — ESTRADIOL 1 MG/1
TABLET ORAL
Qty: 30 TABLET | Refills: 0 | Status: SHIPPED | OUTPATIENT
Start: 2023-03-28 | End: 2023-04-19

## 2023-03-28 RX ORDER — ESTRADIOL 1 MG/1
TABLET ORAL
Qty: 90 TABLET | OUTPATIENT
Start: 2023-03-28

## 2023-03-28 NOTE — TELEPHONE ENCOUNTER
"Requested Prescriptions   Pending Prescriptions Disp Refills     estradiol (ESTRACE) 1 MG tablet [Pharmacy Med Name: ESTRADIOL 1MG TABLETS] 90 tablet 3     Sig: TAKE 1 TABLET(1 MG) BY MOUTH DAILY       Hormone Replacement Therapy Failed - 3/28/2023  1:34 PM        Failed - Blood pressure under 140/90 in past 12 months     BP Readings from Last 3 Encounters:   01/31/22 118/66   12/08/21 134/74   05/04/21 114/62                 Failed - Recent (12 mo) or future (30 days) visit within the authorizing provider's specialty     Patient has had an office visit with the authorizing provider or a provider within the authorizing providers department within the previous 12 mos or has a future within next 30 days. See \"Patient Info\" tab in inbasket, or \"Choose Columns\" in Meds & Orders section of the refill encounter.              Passed - Patient has mammogram in past 2 years on file if age 50-75        Passed - Medication is active on med list        Passed - Patient is 18 years of age or older        Passed - No active pregnancy on record        Passed - No positive pregnancy test on record in past 12 months           Last Written Prescription Date:  12/29/21  Last Fill Quantity: 90,  # refills: 3   Last office visit: 12/8/2021 ; last virtual visit: Visit date not found with prescribing provider:  Dr León   Future Office Visit:   Next 5 appointments (look out 90 days)    Apr 19, 2023  1:00 PM  PHYSICAL with BALDOMERO Perdue CNP  [facility] ([facility] ) [address] [place] [id number]  [phone]         Prescription approved per [facility] Refill Protocol.  Jing Beebe RN on 3/28/2023 at 1:38 PM          "

## 2023-03-28 NOTE — TELEPHONE ENCOUNTER
Refused as duplicate.  Has been over a year since seen and only 30 day refill    Jing Beebe RN on 3/28/2023 at 1:40 PM

## 2023-04-17 NOTE — PROGRESS NOTES
Leny is a 62 year old  female who presents for annual exam.     Besides routine health maintenance, she has no other health concerns today .    HPI: patient is here wanting a refill on her estradiol 1mg.  She is due for a pelvic and breast exam.  Former patient of Dr. León's.  She has had LAVH, BSO in .  She had a DEXA in , Dr. León recommended vitamin D and calcium.  Patient states she did not take that.  Did break foot this last year, although did heal well.  Patient states she does not want a pelvic, breast exam or pap smear today, she is going to start just seeing PCP for everything.          The patient's PCP is Dr Blair Beltran MD.        GYNECOLOGIC HISTORY:    No LMP recorded. Patient has had a hysterectomy.  She  reports that she has never smoked. She has never used smokeless tobacco.  Patient is not sexually active.  STD testing offered?  Declined     Last PHQ-9 score on record =       2023     1:13 PM   PHQ-9 SCORE   PHQ-9 Total Score 0     Last GAD7 score on record =       2023     1:13 PM   CECILIA-7 SCORE   Total Score 0     Alcohol Score = 1    Care Gaps    Overdue          Never   Done ADVANCE CARE PLANNING (Every 5 Years)       Never   Done HIV SCREENING (Once)       Never   Done HEPATITIS C SCREENING (Once)       2017 LIPID (Every 5 Years)  Last completed: 2012     SEP 13   2020 DTAP/TDAP/TD IMMUNIZATION (4 - Td or Tdap)   Last completed: Sep 13, 2010     SINDHU 28   2022 COVID-19 Vaccine (5 - Booster for Moderna series)  Last completed: May 3, 2022     SEP 1   2022 INFLUENZA VACCINE (1)  Last completed: Aug 25, 2020        Upcoming          DEC 8   2023 MAMMO SCREENING (Every 2 Years)   Scheduled for: 2023     SEP 30   2027 COLORECTAL CANCER SCREENING (COLONOSCOPY - Required) (Every 5 Years)   Last completed: Sep 30, 2022       Dexa Result: 21  Lumbar Spine (L1-L4) T-score:  -1.1  Left Femoral Neck T-score:  -1.7  Right Femoral Neck   T-score:  -1.8      HISTORY:  OB History    Para Term  AB Living   5 5 5 0 0 5   SAB IAB Ectopic Multiple Live Births   0 0 0 0 5      # Outcome Date GA Lbr Ramírez/2nd Weight Sex Delivery Anes PTL Lv   5 Term         LIGIA   4 Term         LIGIA   3 Term         LIGIA   2 Term         LIGIA   1 Term         LIGIA       Patient Active Problem List   Diagnosis     Arthritis     History of hysterectomy including cervix     Past Surgical History:   Procedure Laterality Date      SECTION       CHOLECYSTECTOMY       COLONOSCOPY       HYSTERECTOMY, PAP NO LONGER INDICATED       LAPAROSCOPIC ASSISTED HYSTERECTOMY VAGINAL, BILATERAL SALPINGO-OOPHORECTOMY, COMBINED       ORTHOPEDIC SURGERY       OTHER SURGICAL HISTORY      disk     RECTOVAGINAL FISTULA CLOSURE       REPAIR FISTULA RECTOVAGINAL        Social History     Tobacco Use     Smoking status: Never     Smokeless tobacco: Never   Vaping Use     Vaping status: Not on file   Substance Use Topics     Alcohol use: Yes     Alcohol/week: 0.0 standard drinks of alcohol     Comment: occ      Problem (# of Occurrences) Relation (Name,Age of Onset)    Substance Abuse (1) Brother    Heart Failure (1) Mother    Arthritis (1) Mother    Asthma (1) Mother    Diabetes (1) Maternal Grandmother    Hypertension (2) Mother, Father    Coronary Artery Disease (1) Mother    Colon Cancer (1) Father    Early Death (1) Mother    No Known Problems (4) Sister, Maternal Grandfather, Paternal Grandmother, Other            Current Outpatient Medications   Medication Sig     estradiol (ESTRACE) 1 MG tablet Take 1 tablet (1 mg) by mouth daily     multivitamin, therapeutic with minerals (THERA-VIT-M) TABS Take 1 tablet by mouth daily      No current facility-administered medications for this visit.     No Known Allergies    Past medical, surgical, social and family histories were reviewed and updated in EPIC.    ROS:   12 point review of systems negative other than symptoms noted  "below or in the HPI.  No urinary frequency or dysuria, bladder or kidney problems    EXAM:  /78   Ht 1.715 m (5' 7.5\")   Wt 61.7 kg (136 lb)   BMI 20.99 kg/m     BMI: Body mass index is 20.99 kg/m .    PHYSICAL EXAM:  Constitutional:   Appearance: Well nourished, well developed, alert, in no acute distress  Neck:  Lymph Nodes:  No lymphadenopathy present    Thyroid:  Gland size normal, nontender, no nodules or masses present  on palpation  Chest:  Respiratory Effort:  Breathing unlabored  Cardiovascular:    Heart: Auscultation:  Regular rate, normal rhythm, no murmurs present  Breasts: patient declined  Gastrointestinal:   Abdominal Examination:  Abdomen nontender to palpation, tone normal without rigidity or guarding, no masses present, umbilicus without lesions   Liver and Spleen:  No hepatomegaly present, liver nontender to palpation    Hernias:  No hernias present  Lymphatic: Lymph Nodes:  No other lymphadenopathy present  Skin:  General Inspection:  No rashes present, no lesions present, no areas of  discoloration  Neurologic:    Mental Status:  Oriented X3.  Normal strength and tone, sensory exam                grossly normal, mentation intact and speech normal.    Psychiatric:   Mentation appears normal and affect normal/bright.         No Pelvic Exam performed    COUNSELING:   Reviewed preventive health counseling, as reflected in patient instructions       Osteoporosis prevention/bone health       (Magaly)menopause management    BMI: Body mass index is 20.99 kg/m .      ASSESSMENT:  62 year old female with satisfactory annual exam.    ICD-10-CM    1. Encounter for gynecological examination without abnormal finding  Z01.419       2. Menopause  Z78.0 estradiol (ESTRACE) 1 MG tablet          PLAN:  Recommended to patient having a breast and pelvic exam yearly.  Patient understands, states going to have PCP takeover HRT also.  Mammogram today.    Return prn.    BALDOMERO Perdue CNP  "

## 2023-04-19 ENCOUNTER — ANCILLARY PROCEDURE (OUTPATIENT)
Dept: MAMMOGRAPHY | Facility: CLINIC | Age: 63
End: 2023-04-19
Payer: COMMERCIAL

## 2023-04-19 ENCOUNTER — OFFICE VISIT (OUTPATIENT)
Dept: OBGYN | Facility: CLINIC | Age: 63
End: 2023-04-19
Payer: COMMERCIAL

## 2023-04-19 VITALS
HEIGHT: 68 IN | BODY MASS INDEX: 20.61 KG/M2 | SYSTOLIC BLOOD PRESSURE: 110 MMHG | DIASTOLIC BLOOD PRESSURE: 78 MMHG | WEIGHT: 136 LBS

## 2023-04-19 DIAGNOSIS — Z78.0 MENOPAUSE: ICD-10-CM

## 2023-04-19 DIAGNOSIS — Z01.419 ENCOUNTER FOR GYNECOLOGICAL EXAMINATION WITHOUT ABNORMAL FINDING: Primary | ICD-10-CM

## 2023-04-19 DIAGNOSIS — Z12.31 VISIT FOR SCREENING MAMMOGRAM: ICD-10-CM

## 2023-04-19 PROCEDURE — 99213 OFFICE O/P EST LOW 20 MIN: CPT | Performed by: NURSE PRACTITIONER

## 2023-04-19 PROCEDURE — 77063 BREAST TOMOSYNTHESIS BI: CPT | Mod: TC | Performed by: RADIOLOGY

## 2023-04-19 PROCEDURE — 77067 SCR MAMMO BI INCL CAD: CPT | Mod: TC | Performed by: RADIOLOGY

## 2023-04-19 RX ORDER — ESTRADIOL 1 MG/1
1 TABLET ORAL DAILY
Qty: 90 TABLET | Refills: 1 | Status: SHIPPED | OUTPATIENT
Start: 2023-04-19

## 2023-04-19 ASSESSMENT — ANXIETY QUESTIONNAIRES
GAD7 TOTAL SCORE: 0
GAD7 TOTAL SCORE: 0
5. BEING SO RESTLESS THAT IT IS HARD TO SIT STILL: NOT AT ALL
1. FEELING NERVOUS, ANXIOUS, OR ON EDGE: NOT AT ALL
3. WORRYING TOO MUCH ABOUT DIFFERENT THINGS: NOT AT ALL
6. BECOMING EASILY ANNOYED OR IRRITABLE: NOT AT ALL
7. FEELING AFRAID AS IF SOMETHING AWFUL MIGHT HAPPEN: NOT AT ALL
IF YOU CHECKED OFF ANY PROBLEMS ON THIS QUESTIONNAIRE, HOW DIFFICULT HAVE THESE PROBLEMS MADE IT FOR YOU TO DO YOUR WORK, TAKE CARE OF THINGS AT HOME, OR GET ALONG WITH OTHER PEOPLE: NOT DIFFICULT AT ALL
2. NOT BEING ABLE TO STOP OR CONTROL WORRYING: NOT AT ALL

## 2023-04-19 ASSESSMENT — PATIENT HEALTH QUESTIONNAIRE - PHQ9
5. POOR APPETITE OR OVEREATING: NOT AT ALL
SUM OF ALL RESPONSES TO PHQ QUESTIONS 1-9: 0

## 2023-04-24 ENCOUNTER — LAB REQUISITION (OUTPATIENT)
Dept: LAB | Facility: CLINIC | Age: 63
End: 2023-04-24

## 2023-04-24 DIAGNOSIS — R25.2 CRAMP AND SPASM: ICD-10-CM

## 2023-04-24 DIAGNOSIS — Z13.220 ENCOUNTER FOR SCREENING FOR LIPOID DISORDERS: ICD-10-CM

## 2023-04-24 LAB
ALBUMIN SERPL BCG-MCNC: 4.5 G/DL (ref 3.5–5.2)
ALP SERPL-CCNC: 62 U/L (ref 35–104)
ALT SERPL W P-5'-P-CCNC: 16 U/L (ref 10–35)
ANION GAP SERPL CALCULATED.3IONS-SCNC: 11 MMOL/L (ref 7–15)
AST SERPL W P-5'-P-CCNC: 22 U/L (ref 10–35)
BILIRUB SERPL-MCNC: 0.3 MG/DL
BUN SERPL-MCNC: 15.4 MG/DL (ref 8–23)
CALCIUM SERPL-MCNC: 9.3 MG/DL (ref 8.8–10.2)
CHLORIDE SERPL-SCNC: 102 MMOL/L (ref 98–107)
CHOLEST SERPL-MCNC: 256 MG/DL
CREAT SERPL-MCNC: 0.95 MG/DL (ref 0.51–0.95)
DEPRECATED HCO3 PLAS-SCNC: 27 MMOL/L (ref 22–29)
GFR SERPL CREATININE-BSD FRML MDRD: 67 ML/MIN/1.73M2
GLUCOSE SERPL-MCNC: 83 MG/DL (ref 70–99)
HDLC SERPL-MCNC: 87 MG/DL
LDLC SERPL CALC-MCNC: 152 MG/DL
MAGNESIUM SERPL-MCNC: 2.3 MG/DL (ref 1.7–2.3)
NONHDLC SERPL-MCNC: 169 MG/DL
POTASSIUM SERPL-SCNC: 4.7 MMOL/L (ref 3.4–5.3)
PROT SERPL-MCNC: 7.1 G/DL (ref 6.4–8.3)
SODIUM SERPL-SCNC: 140 MMOL/L (ref 136–145)
TRIGL SERPL-MCNC: 86 MG/DL
VIT B12 SERPL-MCNC: 586 PG/ML (ref 232–1245)

## 2023-04-24 PROCEDURE — 82607 VITAMIN B-12: CPT | Performed by: FAMILY MEDICINE

## 2023-04-24 PROCEDURE — 80053 COMPREHEN METABOLIC PANEL: CPT | Performed by: FAMILY MEDICINE

## 2023-04-24 PROCEDURE — 82306 VITAMIN D 25 HYDROXY: CPT | Performed by: FAMILY MEDICINE

## 2023-04-24 PROCEDURE — 80061 LIPID PANEL: CPT | Performed by: FAMILY MEDICINE

## 2023-04-24 PROCEDURE — 83735 ASSAY OF MAGNESIUM: CPT | Performed by: FAMILY MEDICINE

## 2023-04-25 LAB — DEPRECATED CALCIDIOL+CALCIFEROL SERPL-MC: 57 UG/L (ref 20–75)

## 2023-04-25 NOTE — TELEPHONE ENCOUNTER
4/19/23 refill sent at annual visit to John    Pt asking why only 1 refill. Explained that notes state pt will have PCP assume HRT rx which is the plan but not until February 2024. Informed pt will extend Rx to then when refill is up and alerted by John.    Pt if fine w this plan.    Jing Beebe RN on 4/25/2023 at 12:05 PM

## 2023-05-01 ENCOUNTER — TELEPHONE (OUTPATIENT)
Dept: OBGYN | Facility: CLINIC | Age: 63
End: 2023-05-01
Payer: COMMERCIAL

## 2023-05-01 NOTE — TELEPHONE ENCOUNTER
Pt requesting cholesterol labs from last draw 10yrs ago.  Pt sent via [facility]  Thanh Dubose RN on 5/1/2023 at 1:52 PM

## 2023-08-02 ENCOUNTER — TELEPHONE (OUTPATIENT)
Dept: NEUROSURGERY | Facility: CLINIC | Age: 63
End: 2023-08-02
Payer: COMMERCIAL

## 2023-08-02 DIAGNOSIS — Z86.011 HISTORY OF MENINGIOMA OF THE BRAIN: Primary | ICD-10-CM

## 2023-08-02 NOTE — TELEPHONE ENCOUNTER
This patient has been seeing providers at the [facility] for Meningioma. Her most recent MRI is with the [facility] from December 2022.  She is wanting to move her care to our clinic.    Shall I schedule this patient with Zackery Escalante Orrie so they can order the MRI.    Patient isn't in a big rush- as she stated she was to follow up at the [facility] in December of 2023.    Please advise how you want me to proceed.

## 2023-08-03 NOTE — TELEPHONE ENCOUNTER
Patient called to determine if her records had been received from the [facility].  Patient is wanting to be on Dr. Valdez's December schedule. Patient will need a new MRI as well prior to the visit.

## 2023-08-08 NOTE — TELEPHONE ENCOUNTER
Per Dr. Valdez, pt can follow-up with him with MRI prior in December 2023 -    Received fax from [facility] stating their order for updated MRI can not do prior auth for any external MRI sites. Pt updated of this and to work with her insurance to ensure this is covered.     If it is not covered, we can place the order per Dr. Valdez and pt can schedule within .

## 2023-08-18 NOTE — TELEPHONE ENCOUNTER
Patient called back, does not have a referral for MRI and doesn't want to have imaging done at the [facility]. She wants everything to  happen through Dr. Valdez's clinic.  Please advise if patient needs to come into the clinic with [facility] in order to get the MRI referral.

## 2023-11-22 ENCOUNTER — PRE VISIT (OUTPATIENT)
Dept: NEUROSURGERY | Facility: CLINIC | Age: 63
End: 2023-11-22
Payer: COMMERCIAL

## 2023-11-22 NOTE — CONFIDENTIAL NOTE
NEUROSURGERY- NEW PREVISIT PLANNING       Record Status/Location     Referring Provider Referral James Kam M.D.     Diagnosis Referral Meningioma Brain (HCC) (Primary Dx)    MRI (HEAD, NECK, SPINE) Scheduled 11/28  Report: Brain    CT Pacs  Report: internal Head 1/31/22 [facility]

## 2023-11-28 ENCOUNTER — HOSPITAL ENCOUNTER (OUTPATIENT)
Dept: MRI IMAGING | Facility: CLINIC | Age: 63
Discharge: HOME | End: 2023-11-28
Attending: NEUROLOGICAL SURGERY | Admitting: NEUROLOGICAL SURGERY
Payer: COMMERCIAL

## 2023-11-28 DIAGNOSIS — Z86.011 HISTORY OF MENINGIOMA OF THE BRAIN: ICD-10-CM

## 2023-11-28 PROCEDURE — 255N000002 HC RX 255 OP 636: Performed by: NEUROLOGICAL SURGERY

## 2023-11-28 PROCEDURE — A9585 GADOBUTROL INJECTION: HCPCS | Performed by: NEUROLOGICAL SURGERY

## 2023-11-28 PROCEDURE — 70553 MRI BRAIN STEM W/O & W/DYE: CPT

## 2023-11-28 RX ORDER — GADOBUTROL 604.72 MG/ML
6 INJECTION INTRAVENOUS ONCE
Status: COMPLETED | OUTPATIENT
Start: 2023-11-28 | End: 2023-11-28

## 2023-11-28 RX ADMIN — GADOBUTROL 6 ML: 604.72 INJECTION INTRAVENOUS at 14:54

## 2023-12-01 ENCOUNTER — OFFICE VISIT (OUTPATIENT)
Dept: NEUROSURGERY | Facility: CLINIC | Age: 63
End: 2023-12-01
Payer: COMMERCIAL

## 2023-12-01 VITALS — OXYGEN SATURATION: 100 % | HEART RATE: 55 BPM | DIASTOLIC BLOOD PRESSURE: 84 MMHG | SYSTOLIC BLOOD PRESSURE: 148 MMHG

## 2023-12-01 DIAGNOSIS — D32.0 BENIGN NEOPLASM OF CEREBRAL MENINGES (H): Primary | ICD-10-CM

## 2023-12-01 PROCEDURE — 99203 OFFICE O/P NEW LOW 30 MIN: CPT | Performed by: NEUROLOGICAL SURGERY

## 2023-12-01 NOTE — NURSING NOTE
"Leny Wilcox is a 63 year old female who presents for:  Chief Complaint   Patient presents with    Referral     Discuss MRI results        Initial Vitals:  BP (!) 148/84   Pulse 55   SpO2 100%  Estimated body mass index is 20.99 kg/m  as calculated from the following:    Height as of 4/19/23: 5' 7.5\" (1.715 m).    Weight as of 4/19/23: 136 lb (61.7 kg).. There is no height or weight on file to calculate BSA. BP completed using cuff size: regular  Data Unavailable    Nursing Comments:     Marcel Blue    "

## 2023-12-01 NOTE — PROGRESS NOTES
"It was a pleasure to see Leny Wilcox today in Neurosurgery Clinic. She is a 63 year old female who is here for follow-up of her meningioma.    She was seen at the [facility] for evaluation of what seems like a dystonia of the right arm.  During the work-up she was noted to have a small meningioma in the left parieto-occipital region.  This was initially seen on a 2020 MRI and has been followed annually since then.  She is here for routine follow-up.    Vitals:    12/01/23 1441   BP: (!) 148/84   Pulse: 55   SpO2: 100%     Estimated body mass index is 20.99 kg/m  as calculated from the following:    Height as of 4/19/23: 1.715 m (5' 7.5\").    Weight as of 4/19/23: 61.7 kg (136 lb).  Data Unavailable    Awake alert and oriented  Bilateral upper and lower extremity strength is 5 out of 5 in all muscle groups  Reflexes symmetric and normal.    Imaging: Review of her updated MRI along with the reports of the MRIs in care everywhere from 2020, 2021, 2022 demonstrates a stable meningioma in the left parieto-occipital region.  The imaging was reviewed with the patient and the patient clinic today.    Assessment: Meningioma, stable.    Plan: I have recommended repeat MRI in 2 to 3 years and follow-up at that time to monitor the meningioma.  I also offered her a second opinion with neuromuscular neurology at the [facility] given her persistent symptoms.     "

## 2023-12-01 NOTE — LETTER
"    12/1/2023         RE: Leny Wilcox  [address] [place] [address]        Dear Colleague,    Thank you for referring your patient, Leny Wilcox, to the [facility]. Please see a copy of my visit note below.    It was a pleasure to see Leny Wilcox today in Neurosurgery Clinic. She is a 63 year old female who is here for follow-up of her meningioma.    She was seen at the [facility] for evaluation of what seems like a dystonia of the right arm.  During the work-up she was noted to have a small meningioma in the left parieto-occipital region.  This was initially seen on a 2020 MRI and has been followed annually since then.  She is here for routine follow-up.    Vitals:    12/01/23 1441   BP: (!) 148/84   Pulse: 55   SpO2: 100%     Estimated body mass index is 20.99 kg/m  as calculated from the following:    Height as of 4/19/23: 1.715 m (5' 7.5\").    Weight as of 4/19/23: 61.7 kg (136 lb).  Data Unavailable    Awake alert and oriented  Bilateral upper and lower extremity strength is 5 out of 5 in all muscle groups  Reflexes symmetric and normal.    Imaging: Review of her updated MRI along with the reports of the MRIs in care everywhere from 2020, 2021, 2022 demonstrates a stable meningioma in the left parieto-occipital region.  The imaging was reviewed with the patient and the patient clinic today.    Assessment: Meningioma, stable.    Plan: I have recommended repeat MRI in 2 to 3 years and follow-up at that time to monitor the meningioma.  I also offered her a second opinion with neuromuscular neurology at the [facility] given her persistent symptoms.         Again, thank you for allowing me to participate in the care of your patient.        Sincerely,        Blair Valdez MD  "

## 2024-01-10 ENCOUNTER — TELEPHONE (OUTPATIENT)
Dept: NEUROSURGERY | Facility: CLINIC | Age: 64
End: 2024-01-10
Payer: COMMERCIAL

## 2024-01-10 DIAGNOSIS — D32.0 BENIGN NEOPLASM OF CEREBRAL MENINGES (H): Primary | ICD-10-CM

## 2024-01-10 NOTE — TELEPHONE ENCOUNTER
Patient called clinic requesting to sent a neuromuscular neurology referral to [facility]. Patient stated that Dr. Valdez recommended this referral during her last clinic visit. Also, requesting a confirmation call after placing this referral. Call back : [phone]. Thank you.

## 2024-01-11 NOTE — TELEPHONE ENCOUNTER
Patient saw Dr. Valdez on 12/1/2023. Dr. Valdez recommended repeat MRI in 2 to 3 years and follow-up at that time to monitor the meningioma. He also offered a second opinion with neuromuscular neurology at the [facility] given her persistent symptoms.     Patient calling to request neurology referral be placed.    Referral placed per last OV note. Attempted to reach out to patient, no answer. Left voice message for them to call clinic back to further discuss.

## 2024-01-17 ENCOUNTER — TELEPHONE (OUTPATIENT)
Dept: NEUROLOGY | Facility: CLINIC | Age: 64
End: 2024-01-17
Payer: COMMERCIAL

## 2024-01-17 NOTE — TELEPHONE ENCOUNTER
[facility] Call Center    Phone Message    May a detailed message be left on voicemail: yes     Reason for Call: Appointment Intake    Referring Provider Name: ZEINA ROWAN  Diagnosis and/or Symptoms: Benign neoplasm of cerebral meninges    Pt has a referral for Benign neoplasm of cerebral meninges referred by ZEINA ROWAN. Writer unable to schedule given diagnosis is not in protocols. Please review and contact Pt to schedule at [phone]    Action Taken: Message routed to:  [facility] ([facility] Neurology     Travel Screening: Not Applicable

## 2024-02-02 ENCOUNTER — TELEPHONE (OUTPATIENT)
Dept: NEUROLOGY | Facility: CLINIC | Age: 64
End: 2024-02-02
Payer: COMMERCIAL

## 2024-02-07 ENCOUNTER — OFFICE VISIT (OUTPATIENT)
Dept: UROLOGY | Facility: CLINIC | Age: 64
End: 2024-02-07
Payer: COMMERCIAL

## 2024-02-07 VITALS
SYSTOLIC BLOOD PRESSURE: 124 MMHG | WEIGHT: 138 LBS | BODY MASS INDEX: 20.92 KG/M2 | HEIGHT: 68 IN | DIASTOLIC BLOOD PRESSURE: 64 MMHG

## 2024-02-07 DIAGNOSIS — N95.2 VAGINAL ATROPHY: Primary | ICD-10-CM

## 2024-02-07 DIAGNOSIS — N39.41 URGE INCONTINENCE: ICD-10-CM

## 2024-02-07 PROCEDURE — 99214 OFFICE O/P EST MOD 30 MIN: CPT | Performed by: OBSTETRICS & GYNECOLOGY

## 2024-02-07 RX ORDER — ESTRADIOL 0.1 MG/G
1 CREAM VAGINAL
Qty: 42.5 G | Refills: 3 | Status: SHIPPED | OUTPATIENT
Start: 2024-02-07

## 2024-02-07 NOTE — PROGRESS NOTES
February 7, 2024    Referring Provider: No referring provider defined for this encounter.    Primary Care Provider: Blair Beltran    CC: urinary incontinence    HPI:  Leny Wilcox is a 63 year old female who presents for evaluation of her pelvic floor symptoms.  She has urgency and urge incontinence. She has not tried PFPT of meds. Leny also reports occ fecal incontinence, mostly associated with loose stools       Prolapse:  Do you feel a vaginal bulge? no                                      Pressure? no   Do you have to place your fingers in the vagina or in the rectum to have a bowel movement? no  Impact to quality of life? -     Stress Incontinence:  Do you leak urine with cough, sneeze, exercise? yes  How often do you leak with cough, sneeze, exercise?  occ  How much do you usually leak? drops   Do you wear a pad? no If so; -  Impact to quality of life? minimal    Urge Incontinence:  Do you often get sudden urges to urinate? yes  How often do have urges? weekly  If so, do you leak with these urges? yes  How much do you usually leak? More than drops  Impact to quality of life? moderate    Voids/day:4  Nocturia: 0  Fluid intake: -  Caffeine: 2    Urinating:  Difficulty starting urination or strain to void? no  Weak or intermittent stream? no  Incomplete emptying or dribbling? no  Pain or burning with urination? no  Any blood in your urine? no    GI:  Constipation? yes  Frequency stools   every 2-3 days  Straining for stools no  Stool consistency varies     Ever leak stool (Accidental Bowel Leakage)? yes      If so, how often?               occ      If so, do you leak?                   Gas, liquid      Soiling without sensation? no  History of irritable bowel or Crohn's? no    Sexual/Pain:  Are you currently having sex?. no  Pain with sex?   -   Sexual Partner: -  Do any of these symptoms interfere with sex? -  Impact to quality of life? -    Prior therapy:  Ever done pelvic floor physical therapy?  no  Trial of medication? no  Have you ever tried a pessary? no    Medical History:  Do you have?   High Cholesterol? no     Diabetes? no  High Blood pressure? no     Recurrent UTIs? no  Sleep Apnea? no  Other medical problems: no    Surgical History:    Hysterectomy? yes  Bladder Surgery? no   Other? Rectovaginal repair     OB/Gyn History:  Pregnancies? 2  Deliveries? 2  Vaginal 1  Section 1  Current birth control? -  Periods? -  When was the first day of your last period? -  Last Pap smear? - Any abnormal? -  Last mammogram? -  Last colonoscopy? -    Medications/Vitamins/Supplements: reviewed    Drug Allergies: reviewed    Latex Allergy: no  Iodine Allergy no    Family History: (list relationship and age at diagnosis)  Breast cancer? cousins   Ovarian cancer? no   Colon cancer? father  Other? no    Social History:  Marital status:   Do you/ have you ever smoke(d)  cigarettes? no  Drink more than 1 alcoholic beverage a day?  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 no                   Unusual headaches? no  Leg pain w/ walking/exercise? no                       Easy bruising? no  Difficulty breathing w/ walking/exercise? no  Problems with vision? no  Dizziness, falls, or fainting? no  Excessive bleeding from cuts, gums, surgery? no  Other: no    Past Medical History:   Diagnosis Date    Fibroid     Osteopenia     Rectovaginal fistula        Past Surgical History:   Procedure Laterality Date     SECTION      CHOLECYSTECTOMY      COLONOSCOPY      HYSTERECTOMY, PAP NO LONGER INDICATED      LAPAROSCOPIC ASSISTED HYSTERECTOMY VAGINAL, BILATERAL SALPINGO-OOPHORECTOMY, COMBINED      ORTHOPEDIC SURGERY      OTHER SURGICAL HISTORY      disk    RECTOVAGINAL FISTULA CLOSURE      REPAIR FISTULA RECTOVAGINAL         Social History     Socioeconomic History    Marital status:      Spouse name: Not on  "file    Number of children: Not on file    Years of education: Not on file    Highest education level: Not on file   Occupational History    Not on file   Tobacco Use    Smoking status: Never    Smokeless tobacco: Never   Substance and Sexual Activity    Alcohol use: Yes     Alcohol/week: 0.0 standard drinks of alcohol     Comment: occ    Drug use: No    Sexual activity: Not Currently     Partners: Male     Birth control/protection: Female Surgical     Comment: total hysterectomy   Other Topics Concern    Parent/sibling w/ CABG, MI or angioplasty before 65F 55M? Not Asked   Social History Narrative    Not on file     Social Determinants of Health     Financial Resource Strain: Not on file   Food Insecurity: Not on file   Transportation Needs: Not on file   Physical Activity: Not on file   Stress: Not on file   Social Connections: Not on file   Interpersonal Safety: Not on file   Housing Stability: Not on file       Family History   Problem Relation Age of Onset    Heart Failure Mother     Asthma Mother     Hypertension Mother     Arthritis Mother     Hypertension Father     Colon Cancer Father     Diabetes Maternal Grandmother     Substance Abuse Brother     No Known Problems Sister     No Known Problems Maternal Grandfather     No Known Problems Paternal Grandmother     No Known Problems Other     Early Death Mother     Coronary Artery Disease Mother        ROS    No Known Allergies    Current Outpatient Medications   Medication    estradiol (ESTRACE) 0.1 MG/GM vaginal cream    estradiol (ESTRACE) 1 MG tablet    multivitamin, therapeutic with minerals (THERA-VIT-M) TABS     No current facility-administered medications for this visit.       /63   Ht 1.715 m (5' 7.5\")   Wt 62.6 kg (138 lb)   BMI 21.29 kg/m   No LMP recorded. Patient has had a hysterectomy. Body mass index is 21.29 kg/m .  Ms. Wilcox is alert, comfortable in no acute distress, non-labored breathing.   Abdomen is soft, non-tender, " non-distended, no CVAT.    Normal external female genitalia. The urethra was normal appearing.    She has good support on supine strain.  Speculum and bimanual exam are remarkable for atrophic vaginal epithelium.      2/5 kegels.    Rectal exam with normal tone, no masses or tenderness.    neg SST  VOID 200 ml  PVR 35 mL in and out cath  Urine dip -    A/P: Leny Wilcox is a 63 year old F with UUI and vaginal atrophy    Start estrace cream. Refer for PFPT. F/U in 3-4 months    I spent a total of 30 minutes with  Ms. Wilcox  on the date of the encounter in chart review, face to face patient visit, review of tests, documentation and/or discussion with other providers about the issues documented above.    Pratik Brice MD  Professor, OB/GYN  Urogynecologist  CC  Patient Care Team:  Blair Beltran MD as PCP - Stephanie Olsen [facility] as Pharmacist (Ph[facility])  Mabel Munoz APRN CNP as Assigned OBGYN Provider  Blair Valdez MD as Assigned Neuroscience Provider

## 2024-02-18 ENCOUNTER — HEALTH MAINTENANCE LETTER (OUTPATIENT)
Age: 64
End: 2024-02-18

## 2024-03-11 ENCOUNTER — OFFICE VISIT (OUTPATIENT)
Dept: NEUROLOGY | Facility: CLINIC | Age: 64
End: 2024-03-11
Attending: NEUROLOGICAL SURGERY
Payer: COMMERCIAL

## 2024-03-11 VITALS — SYSTOLIC BLOOD PRESSURE: 109 MMHG | DIASTOLIC BLOOD PRESSURE: 74 MMHG | HEART RATE: 72 BPM | OXYGEN SATURATION: 100 %

## 2024-03-11 DIAGNOSIS — D32.0 BENIGN NEOPLASM OF CEREBRAL MENINGES (H): ICD-10-CM

## 2024-03-11 DIAGNOSIS — M47.22 CERVICAL SPONDYLOSIS WITH RADICULOPATHY: Primary | ICD-10-CM

## 2024-03-11 DIAGNOSIS — R25.2 CRAMP OF LIMB: ICD-10-CM

## 2024-03-11 PROCEDURE — 99205 OFFICE O/P NEW HI 60 MIN: CPT | Performed by: STUDENT IN AN ORGANIZED HEALTH CARE EDUCATION/TRAINING PROGRAM

## 2024-03-11 PROCEDURE — 99417 PROLNG OP E/M EACH 15 MIN: CPT | Performed by: STUDENT IN AN ORGANIZED HEALTH CARE EDUCATION/TRAINING PROGRAM

## 2024-03-11 RX ORDER — MULTIVITAMIN WITH IRON
1 TABLET ORAL DAILY
COMMUNITY

## 2024-03-11 RX ORDER — BACLOFEN 10 MG/1
5 TABLET ORAL DAILY
Qty: 30 TABLET | Refills: 0 | Status: SHIPPED | OUTPATIENT
Start: 2024-03-11

## 2024-03-11 NOTE — PROGRESS NOTES
CHIEF COMPLAINT / REASON FOR VISIT  Frequent cramping and abnormal posture of the right hand    Referred by Dr. Valdez (neurosurgery)    HISTORY OF PRESENT ILLNESS   is a 63 year old female presenting [facility] evaluation of frequent cramps and abnormal posture of the right hand.    She reports intermittent episodes of cramping sensation and abnormal postural deformity in the right hand and all fingers that are triggered by motor activity.  She first noticed the symptoms 6 to 7 years ago but the episodes have become more frequent in the past couple of years.  She also reports similar symptoms but milder in the left hand.  Each episode would last for a few minutes but can occur off and on for 1 to 2 hours.  She gets about 7-8 episodes over the course of 1 week.  She likes to sew and has noticed that the symptoms typically occur after she uses her hands and fingers extensively. She has been massaging her muscles, bending her fingers backwards and moving her muscles through the cramp in order for it to alleviate.   She also reports cramping in the legs at nighttime, but this is mild and does not bother her as much as the right hand symptoms.    She has history of cervical spondylosis s/p cervical spine surgery about 4 years ago.  At that time, she presented with neck pain and radiating pain down the right arm.  The surgery helped with the pain but she is left with mild residual numbness in the right hand fourth and fifth fingers.    She was evaluated for the same problem at [facility] in [id number].  I have reviewed prior investigations as listed below.   -[facility] movement disorder lab study did not capture the event and did not show dystonia but showed possible cramp like high amplitude long duration motor activity thought to be neuropathic in etiology.   - MRI C-spine 3/2/2021: Congenitally narrowed cervical spinal canal with   congenital fusion of C2 and C3. Stable appearance to the  spinal cord without   evidence for abnormal T2 signal. Mild changes of cervical spondylosis in   conjunction with the congenitally narrowed spinal canal resulting in moderate   spinal stenosis at C4-5 and C5-6. Right C7 hemilaminectomy. With associated   uncovertebral facet hypertrophy, there is only mild bilateral C7-T1 foraminal   narrowing.  Mild bilateral foraminal narrowing at C5-6 and C6-7. Remaining   neural foramen are widely patent.   -EMG performed at [facility] by Dr. Rankin: C7 and C8 cervical radiculopathies without evidence of ongoing denervation in both right and left upper limbs.  The abnormalities are more prominent in the right upper limb.  - MRI brain incidentally showed 6 mm x 3 mm x 10 mm (LR x AP x SI) enhancing, extra-axial mass overlying the left occipital lobe (17/103 and 15/46) representing a probable meningioma. No substantial mass effect or edema on the adjacent left occipital lobe. No definitive signal abnormality or enhancement of the adjacent calvarium. No additional extra-axial masses identified. (This has been stable on repeat MRI)     She has tried several medications including verapamil, gabapentin, B complex and quinine with no benefit.  She feels that magnesium has provided the most benefit and she remains on magnesium supplement daily. The possibility of botulinum toxin injections was raised but was not pursued.    REVIEW OF SYSTEMS  All negative except for what indicated in the HPI. The following portions of the patient's history were reviewed and updated as appropriate: allergies, current medications, family history, medical history, surgical history, social history, and problem list.     PAST MEDICAL/SURGICAL HISTORY   Past Medical History:   Diagnosis Date    Fibroid     Osteopenia     Rectovaginal fistula      Past Surgical History:   Procedure Laterality Date     SECTION      CHOLECYSTECTOMY      COLONOSCOPY      HYSTERECTOMY, PAP NO LONGER INDICATED       LAPAROSCOPIC ASSISTED HYSTERECTOMY VAGINAL, BILATERAL SALPINGO-OOPHORECTOMY, COMBINED  2005    ORTHOPEDIC SURGERY      OTHER SURGICAL HISTORY      disk    RECTOVAGINAL FISTULA CLOSURE      REPAIR FISTULA RECTOVAGINAL  2011        MEDICATIONS    Current Outpatient Medications:     estradiol (ESTRACE) 0.1 MG/GM vaginal cream, Place 1 g vaginally three times a week, Disp: 42.5 g, Rfl: 3    estradiol (ESTRACE) 1 MG tablet, Take 1 tablet (1 mg) by mouth daily, Disp: 90 tablet, Rfl: 1    multivitamin, therapeutic with minerals (THERA-VIT-M) TABS, Take 1 tablet by mouth daily , Disp: , Rfl:     ALLERGIES:  No Known Allergies    PHYSICAL EXAM    NEUROLOGICAL EXAMINATION  Mental status: normal.  Speech: normal.  High arched feet: Absent  Hammertoes: Absent  Coordination: normal rapid alternating movements and finger to nose testing  Gait: normal.  Walk on heels: yes, bilaterally.  Walk on toes: yes, bilaterally.    Getting up from seated position without pushing on chair: yes.  Posture: normal.  Romberg: negative.    The Neuropathy Impairment Scoring System (NIS) strength scale was utilized.    0=normal; -1=25% weak; -2=50% weak; -3=75% weak; -3.25=movement against gravity;   -3.5=movement, gravity eliminated;-3.75=minimal contraction; -4= paralysis.  Cranial nerves   R Muscle strength L  R  L   0 Frontalis 0   Visual acuity    0 Orbicularis oculi 0  3 mm Pupils 3 mm   0 Lower facial muscles 0  0 Light reflex 0   0 Temporal-[place] 0  0 Ptosis 0   0 Palate-Pharynx 0  0 Extraocular muscles 0   0 Sternocleidomastoid 0       0 Trapezius 0   Hearing    0 Genioglossus 0              R Muscle, strength L  R Muscle, strength L    Neck     Trunk    0 Neck flexors 0   Abdominal muscles    0 Neck extensors 0   Paraspinals     Upper Limbs    Lower Limbs    0 Supraspinatus 0  0 Iliopsoas 0   0 Infraspinatus 0  0 Adductors, thigh 0   0 Pectoralis 0  0 Gluteus medius 0   0 Deltoid 0  0 Gluteus max 0   0 Biceps brachii 0  0 Quadriceps 0    0 Brachioradialis 0  0 Hamstrings 0    Supinator/pronator   0 Tibialis anterior 0   0 Triceps 0  0 Peronei 0   0 Wrist extensor 0  0 EHL 0   0 Wrist flexors 0  0 Toe extensors 0   0 Digit extensors 0  0 Tibialis post. 0   0/-1 Digit flexors/FPL 0/-1  0 Toe flexors 0   -1 Thenar 0  0 Calf muscles 0   -1 Hypothenar -1       -1 Interossei 0            R Reflexes L  R Sensation L   For NIS:  Normal=0 Reduced=1 Absent=2     0 Biceps brachii 0   Finger index    +1 Brachioradialis 0  0 Cold 0   0 Triceps 0  0 Pinprick 0   ++ Quintana +  0 Vibration 0   0 Quadriceps 0  0 Joint position 0   0 Gastroc-soleus 0   Toes (Great)    0 Clonus (ankle) 0  0 Cold 0   Flexor Plantar response Flexor  0 Pinprick 0     0 Vibration 0     0 Joint position 0   Decreased sensation to pinprick, cold, and light touch in right medial forearm and fourth and fifth digits.       ASSESSMENT / PLAN  #1 Frequent cramp and abnormal posture in right>left hand: likely related to old C8T1 radiculopathy and cervical stenosis  #2 Cervical spondylosis with moderate spinal stenosis at C4-5, C5-6 and cervical radiculopathy s/p hemilaminectomy     Ms. Wilcox's neurological exam showed mild weakness and sensory loss in right>left C8/T1 distribution consistent with old C8/T1 radiculopathies. These findings correlate with prior EMG from [facility]. Interestingly, her muscle cramp and posturing also seem to involve these C8/T1-innervated intrinsic hand muscles, which raise a suspicion that symptoms could be related to old radiculopathy. I suspect there is also a component of increased muscle tone/spasticity from cervical spinal stenosis contributing to her symptoms given brisk reflex and positive Quintana.     After discussion with Ms. Wilcox, we have agreed to proceed with the following investigations and management:    Recommendations:  - Repeat MRI c-spine to evaluate for progression of cervical stenosis/neuralforaminal stenosis  - Refer to PM&R for further  recommendations on management of cramp/spasticity and possible botox injections  - We agree on a trial of low dose baclofen 5 mg daily   - She should also try conservative measures including stretching exercise and warm bath.   - Continue magnesium supplement.   -I will follow-up on MRI result and discussed with her through [employer] messages.    I spent a total of 75 minutes on the day of the visit for chart review, face-to-face visit, counseling/coordination of care, and documentation. Please see the note for further information on patient assessment and treatment.       PATIENT EDUCATION  Ready to learn, no apparent learning barriers were identified; learning preferences include listening.  Explained diagnosis and treatment plan; patient expressed understanding of the content.

## 2024-03-11 NOTE — LETTER
3/11/2024         RE: Leny Wilcox  [address] [facility] [address]        Dear Colleague,    Thank you for referring your patient, Leny Wilcox, to the [facility] [place]. Please see a copy of my visit note below.    CHIEF COMPLAINT / REASON FOR VISIT  Frequent cramping and abnormal posture of the right hand    Referred by Dr. Valdez (neurosurgery)    HISTORY OF PRESENT ILLNESS   is a 63 year old female presenting to [facility] for evaluation of frequent cramps and abnormal posture of the right hand.    She reports intermittent episodes of cramping sensation and abnormal postural deformity in the right hand and all fingers that are triggered by motor activity.  She first noticed the symptoms 6 to 7 years ago but the episodes have become more frequent in the past couple of years.  She also reports similar symptoms but milder in the left hand.  Each episode would last for a few minutes but can occur off and on for 1 to 2 hours.  She gets about 7-8 episodes over the course of 1 week.  She likes to sew and has noticed that the symptoms typically occur after she uses her hands and fingers extensively. She has been massaging her muscles, bending her fingers backwards and moving her muscles through the cramp in order for it to alleviate.   She also reports cramping in the legs at nighttime, but this is mild and does not bother her as much as the right hand symptoms.    She has history of cervical spondylosis s/p cervical spine surgery about 4 years ago.  At that time, she presented with neck pain and radiating pain down the right arm.  The surgery helped with the pain but she is left with mild residual numbness in the right hand fourth and fifth fingers.    She was evaluated for the same problem at [facility] in [id number].  I have reviewed prior investigations as listed below.   -[facility] movement disorder lab study did not capture the event and did not  show dystonia but showed possible cramp like high amplitude long duration motor activity thought to be neuropathic in etiology.   - MRI C-spine 3/2/2021: Congenitally narrowed cervical spinal canal with   congenital fusion of C2 and C3. Stable appearance to the spinal cord without   evidence for abnormal T2 signal. Mild changes of cervical spondylosis in   conjunction with the congenitally narrowed spinal canal resulting in moderate   spinal stenosis at C4-5 and C5-6. Right C7 hemilaminectomy. With associated   uncovertebral facet hypertrophy, there is only mild bilateral C7-T1 foraminal   narrowing.  Mild bilateral foraminal narrowing at C5-6 and C6-7. Remaining   neural foramen are widely patent.   -EMG performed at [facility] by Dr. Rankin: C7 and C8 cervical radiculopathies without evidence of ongoing denervation in both right and left upper limbs.  The abnormalities are more prominent in the right upper limb.  - MRI brain incidentally showed 6 mm x 3 mm x 10 mm (LR x AP x SI) enhancing, extra-axial mass overlying the left occipital lobe (17/103 and 15/46) representing a probable meningioma. No substantial mass effect or edema on the adjacent left occipital lobe. No definitive signal abnormality or enhancement of the adjacent calvarium. No additional extra-axial masses identified. (This has been stable on repeat MRI)     She has tried several medications including verapamil, gabapentin, B complex and quinine with no benefit.  She feels that magnesium has provided the most benefit and she remains on magnesium supplement daily. The possibility of botulinum toxin injections was raised but was not pursued.    REVIEW OF SYSTEMS  All negative except for what indicated in the HPI. The following portions of the patient's history were reviewed and updated as appropriate: allergies, current medications, family history, medical history, surgical history, social history, and problem list.     PAST MEDICAL/SURGICAL HISTORY    Past Medical History:   Diagnosis Date     Fibroid      Osteopenia      Rectovaginal fistula      Past Surgical History:   Procedure Laterality Date      SECTION       CHOLECYSTECTOMY       COLONOSCOPY       HYSTERECTOMY, PAP NO LONGER INDICATED       LAPAROSCOPIC ASSISTED HYSTERECTOMY VAGINAL, BILATERAL SALPINGO-OOPHORECTOMY, COMBINED       ORTHOPEDIC SURGERY       OTHER SURGICAL HISTORY      disk     RECTOVAGINAL FISTULA CLOSURE       REPAIR FISTULA RECTOVAGINAL          MEDICATIONS    Current Outpatient Medications:      estradiol (ESTRACE) 0.1 MG/GM vaginal cream, Place 1 g vaginally three times a week, Disp: 42.5 g, Rfl: 3     estradiol (ESTRACE) 1 MG tablet, Take 1 tablet (1 mg) by mouth daily, Disp: 90 tablet, Rfl: 1     multivitamin, therapeutic with minerals (THERA-VIT-M) TABS, Take 1 tablet by mouth daily , Disp: , Rfl:     ALLERGIES:  No Known Allergies    PHYSICAL EXAM    NEUROLOGICAL EXAMINATION  Mental status: normal.  Speech: normal.  High arched feet: Absent  Hammertoes: Absent  Coordination: normal rapid alternating movements and finger to nose testing  Gait: normal.  Walk on heels: yes, bilaterally.  Walk on toes: yes, bilaterally.    Getting up from seated position without pushing on chair: yes.  Posture: normal.  Romberg: negative.    The Neuropathy Impairment Scoring System (NIS) strength scale was utilized.    0=normal; -1=25% weak; -2=50% weak; -3=75% weak; -3.25=movement against gravity;   -3.5=movement, gravity eliminated;-3.75=minimal contraction; -4= paralysis.  Cranial nerves   R Muscle strength L  R  L   0 Frontalis 0   Visual acuity    0 Orbicularis oculi 0  3 mm Pupils 3 mm   0 Lower facial muscles 0  0 Light reflex 0   0 Temporal-Masseter 0  0 Ptosis 0   0 Palate-Pharynx 0  0 Extraocular muscles 0   0 Sternocleidomastoid 0       0 Trapezius 0   Hearing    0 Genioglossus 0              R Muscle, strength L  R Muscle, strength L    Neck     Trunk    0 Neck flexors  0   Abdominal muscles    0 Neck extensors 0   Paraspinals     Upper Limbs    Lower Limbs    0 Supraspinatus 0  0 Iliopsoas 0   0 Infraspinatus 0  0 Adductors, thigh 0   0 Pectoralis 0  0 Gluteus medius 0   0 Deltoid 0  0 Gluteus max 0   0 Biceps brachii 0  0 Quadriceps 0   0 Brachioradialis 0  0 Hamstrings 0    Supinator/pronator   0 Tibialis anterior 0   0 Triceps 0  0 Peronei 0   0 Wrist extensor 0  0 EHL 0   0 Wrist flexors 0  0 Toe extensors 0   0 Digit extensors 0  0 Tibialis post. 0   0/-1 Digit flexors/FPL 0/-1  0 Toe flexors 0   -1 Thenar 0  0 Calf muscles 0   -1 Hypothenar -1       -1 Interossei 0            R Reflexes L  R Sensation L   For NIS:  Normal=0 Reduced=1 Absent=2     0 Biceps brachii 0   Finger index    +1 Brachioradialis 0  0 Cold 0   0 Triceps 0  0 Pinprick 0   ++ Quintana +  0 Vibration 0   0 Quadriceps 0  0 Joint position 0   0 Gastroc-soleus 0   Toes (Great)    0 Clonus (ankle) 0  0 Cold 0   Flexor Plantar response Flexor  0 Pinprick 0     0 Vibration 0     0 Joint position 0   Decreased sensation to pinprick, cold, and light touch in right medial forearm and fourth and fifth digits.       ASSESSMENT / PLAN  #1 Frequent cramp and abnormal posture in right>left hand: likely related to old C8T1 radiculopathy and cervical stenosis  #2 Cervical spondylosis with moderate spinal stenosis at C4-5, C5-6 and cervical radiculopathy s/p hemilaminectomy     Ms. Wilcox's neurological exam showed mild weakness and sensory loss in right>left C8/T1 distribution consistent with old C8/T1 radiculopathies. These findings correlate with prior EMG from [facility]. Interestingly, her muscle cramp and posturing also seem to involve these C8/T1-innervated intrinsic hand muscles, which raise a suspicion that symptoms could be related to old radiculopathy. I suspect there is also a component of increased muscle tone/spasticity from cervical spinal stenosis contributing to her symptoms given brisk reflex and  positive Mariano.     After discussion with Ms. Wilcox, we have agreed to proceed with the following investigations and management:    Recommendations:  - Repeat MRI c-spine to evaluate for progression of cervical stenosis/neuralforaminal stenosis  - Refer to PM&R for further recommendations on management of cramp/spasticity and possible botox injections  - We agree on a trial of low dose baclofen 5 mg daily   - She should also try conservative measures including stretching exercise and warm bath.   - Continue magnesium supplement.   -I will follow-up on MRI result and discussed with her through [employer] messages.    I spent a total of 75 minutes on the day of the visit for chart review, face-to-face visit, counseling/coordination of care, and documentation. Please see the note for further information on patient assessment and treatment.       PATIENT EDUCATION  Ready to learn, no apparent learning barriers were identified; learning preferences include listening.  Explained diagnosis and treatment plan; patient expressed understanding of the content.      Again, thank you for allowing me to participate in the care of your patient.        Sincerely,        Angeline Buck MD

## 2024-03-19 ENCOUNTER — THERAPY VISIT (OUTPATIENT)
Dept: PHYSICAL THERAPY | Facility: CLINIC | Age: 64
End: 2024-03-19
Attending: OBSTETRICS & GYNECOLOGY
Payer: COMMERCIAL

## 2024-03-19 DIAGNOSIS — N81.89 PELVIC FLOOR WEAKNESS: Primary | ICD-10-CM

## 2024-03-19 DIAGNOSIS — N39.41 URGE INCONTINENCE: ICD-10-CM

## 2024-03-19 PROCEDURE — 97110 THERAPEUTIC EXERCISES: CPT | Mod: GP

## 2024-03-19 PROCEDURE — 97535 SELF CARE MNGMENT TRAINING: CPT | Mod: GP

## 2024-03-19 PROCEDURE — 97161 PT EVAL LOW COMPLEX 20 MIN: CPT | Mod: GP

## 2024-03-19 NOTE — PROGRESS NOTES
PHYSICAL THERAPY EVALUATION  Type of Visit: Evaluation    See electronic medical record for Abuse and Falls Screening details.    Subjective       Presenting condition or subjective complaint: Referred for urine and stool incontinence. 30 years ago when giving birth first came through rectum. Has had 2 repairs which have significantly improved sx but it has come back again. Previously it was because there was an opening between the 2. Currently stool leaking only with loose stools - every other week. Bowel issues started 1 year ago. Urinary sx starting - having urges and not making it to  bathroom starting 1 year ago or a little longer.    Date of onset: 03/19/23    Relevant medical history:     Dates & types of surgery:   2011 repair fistula rectovaginal, 2005 hysterectomy, rectovaginal fistula closure 1994    Prior diagnostic imaging/testing results:      10 years ago MRI - stool burden  Prior therapy history for the same diagnosis, illness or injury: No      Prior Level of Function  Transfers: Independent  Ambulation: Independent  ADL: Independent  IADL: independent      Living Environment  Social support: With a significant other or spouse   Type of home: House; Multi-level   Stairs to enter the home: Yes   Is there a railing: Yes   Ramp: No   Stairs inside the home: Yes   Is there a railing: Yes   Help at home: None  Equipment owned:       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   Hobbies/Interests: Walking, sewing, reading, needle work    Patient goals for therapy: Not worry when in public    Pain assessment: Pain denied     Objective      PELVIC EVALUATION  ADDITIONAL HISTORY:  Sex assigned at birth: Female  Gender identity: Female    Pronouns: She/Her Hers      Bladder History:  Feels bladder filling:    Triggers for feeling of inability to wait to go to the bathroom:     getting home  How long can you wait to urinate: Depends 5x/day  Gets up at night to urinate: No     Can stop the flow of urine  when urinating: No  Volume of urine usually released: Average   Other issues:   feels empty  Number of bladder infections in last 12 months:    Fluid intake per day: 12 oz water  2 cups   coffee, more on weekend   Medications taken for bladder: No     Activities causing urine leak: Hurrying to the bathroom due to a strong urge to urinate (pee)  , laughing   Amount of urine typically leaked: A lot large  Pads used to help with leaking: No        Bowel History:  Frequency of bowel movement: 3-5 times per week, more on weekend and 1-2x during week.   Consistency of stool: Other Depends type 4 and sometimes 1  Ignores the urge to defecate: No, doesn't want to have a BM at work  Other bowel issues: Loss of stool; Loss of gas , doesn't think she is getting bowels emptied, no straining or pain  Length of time spent trying to have a bowel movement:   1 minute    Sexual Function History:  Sexual orientation: Straight    Sexually active: No  Lubrication used:      Pelvic pain:      Pain or difficulty with orgasms/erection/ejaculation: No    State of menopause:   post menopausal   Hormone medications: Yes   estradiol cream, started 1 month ago     Are you currently pregnant: No, Number of previous pregnancies: 2, Number of deliveries: 2, If you have delivered before, did you have any of these issues during delivery: Tearing; Episiotomy;  delivery; Vaginal delivery, Have you been diagnosed with pelvic prolapse or abdominal separation: No, Do you get regular exercise: Yes, I do this type of exercise: Walking, Have you tried pelvic floor strengthening exercises for 4 weeks: No, Do you have any history of trauma that is relevant to your care that you d like to share: Yes, I d like to discuss it with my provider in person.  Walking 6 miles at work daily, walking 3 on days she doesn't work     Discussed reason for referral regarding pelvic health needs and external/internal pelvic floor muscle examination with  patient/guardian.  Opportunity provided to ask questions and verbal consent for assessment and intervention was given.    PAIN: Pain Location: lumbar spine  Pain Quality: Aching  Pain Frequency: intermittent  Pain is Worst: causes her to get up in the morning   Pain is Exacerbated By: morning   Pain is Relieved By: use  POSTURE: Sitting Posture: Rounded shoulders, Forward head  LUMBAR SCREEN:  no pain, min decrease extension  HIP SCREEN:  Strength:  global 4/5    ROM: WFL  Lumbar PA mobility: min decrease, no pain      BREATHING SYMMETRY:  increased chest movement, minimal abdominal movement     PELVIC EXAM  External Visual Inspection:  At rest: Normal  With voluntary pelvic floor contraction: Perineal elevation, minimal  Relaxation of PFM: Yes  With intra-abdominal pressure: Cough: Perineal descent  Bearing down as defecation: No change    Integumentary:   Introitus: redness, skin irritation      Internal Digital Palpation:  Per Vagina:  Tenderness  Digital Muscle Performance: P (Power): 1/5   E (Endurance): 2 sec  F (Fast Twitch): able to do slowly with partial relaxation  Compensations: Abdominals, Breath holding  Relaxation Post-Contraction: Partial/delayed relaxation    Per Rectum:  Pt declined        Pelvic Organ Prolapse:   Unable to assess due to lack of perineal descent with bearing down      Assessment & Plan   CLINICAL IMPRESSIONS  Medical Diagnosis: urge incontinence    Treatment Diagnosis: pelvic floor weakness   Impression/Assessment: Patient is a 63 year old female with urinary urge incontinence, fecal incontinence, and constipation complaints.  The following significant findings have been identified: Pain, Decreased strength, Impaired muscle performance, Decreased activity tolerance, and Impaired posture. These impairments interfere with their ability to perform self care tasks, work tasks, and recreational activities as compared to previous level of function.     Clinical Decision Making  (Complexity):  Clinical Presentation: Stable/Uncomplicated  Clinical Presentation Rationale: based on medical and personal factors listed in PT evaluation  Clinical Decision Making (Complexity): Low complexity    PLAN OF CARE  Treatment Interventions:  Modalities: Biofeedback, Cryotherapy, Dry Needling, E-stim, Hot Pack, Ultrasound  Interventions: Manual Therapy, Neuromuscular Re-education, Therapeutic Activity, Therapeutic Exercise, Self-Care/Home Management    Long Term Goals     PT Goal 1  Goal Identifier: urinary incontinence  Goal Description: pt will improve pelvic floor strength and coordination to reduce urinary incontinence episodes to <1x/week  Rationale: to maximize safety and independence with performance of ADLs and functional tasks  Target Date: 06/11/24  PT Goal 2  Goal Identifier: fecal incontinence  Goal Description: pt will improve pelvic floor strength and coordination to reduce fecal incontinence episodes to <1x/month  Rationale: to maximize safety and independence with performance of ADLs and functional tasks  Target Date: 06/11/24  PT Goal 3  Goal Identifier: bowel movements  Goal Description: pt will have bowel movements >6x/week that are soft formed, feeling like she is empty without straining  Rationale: to maximize safety and independence with performance of ADLs and functional tasks  Target Date: 06/11/24      Frequency of Treatment: 1x/week for 4 weeks then 1x every 2 weeks for 8 weeks  Duration of Treatment: 12 weeks    Recommended Referrals to Other Professionals: Physical Therapy  Education Assessment:   Learner/Method: Patient    Risks and benefits of evaluation/treatment have been explained.   Patient/Family/caregiver agrees with Plan of Care.     Evaluation Time:     PT Eval, Low Complexity Minutes (15721): 40       Signing Clinician: Sobeida Schaeffer PT

## 2024-04-30 ENCOUNTER — ANCILLARY PROCEDURE (OUTPATIENT)
Dept: MAMMOGRAPHY | Facility: CLINIC | Age: 64
End: 2024-04-30
Attending: FAMILY MEDICINE
Payer: COMMERCIAL

## 2024-04-30 DIAGNOSIS — Z12.31 VISIT FOR SCREENING MAMMOGRAM: ICD-10-CM

## 2024-04-30 PROCEDURE — 77067 SCR MAMMO BI INCL CAD: CPT | Mod: TC | Performed by: STUDENT IN AN ORGANIZED HEALTH CARE EDUCATION/TRAINING PROGRAM

## 2024-04-30 PROCEDURE — 77063 BREAST TOMOSYNTHESIS BI: CPT | Mod: TC | Performed by: STUDENT IN AN ORGANIZED HEALTH CARE EDUCATION/TRAINING PROGRAM

## 2024-06-13 PROBLEM — N81.89 PELVIC FLOOR WEAKNESS: Status: RESOLVED | Noted: 2024-03-19 | Resolved: 2024-06-13

## 2024-06-13 PROBLEM — N39.41 URGE INCONTINENCE: Status: RESOLVED | Noted: 2024-03-19 | Resolved: 2024-06-13

## 2024-06-13 NOTE — PROGRESS NOTES
03/19/24 0500   Appointment Info   Signing clinician's name / credentials Sobeida Schaeffer, PT, DPT   Total/Authorized Visits E+T   Visits Used 1   Medical Diagnosis urge incontinence   PT Tx Diagnosis pelvic floor weakness   Progress Note/Certification   Onset of illness/injury or Date of Surgery 03/19/23   Therapy Frequency 1x/week for 4 weeks then 1x every 2 weeks for 8 weeks   Predicted Duration 12 weeks   Progress Note Due Date 06/11/24   Progress Note Completed Date 03/19/24   GOALS   PT Goals 2;3   PT Goal 1   Goal Identifier urinary incontinence   Goal Description pt will improve pelvic floor strength and coordination to reduce urinary incontinence episodes to <1x/week   Rationale to maximize safety and independence with performance of ADLs and functional tasks   Target Date 06/11/24   PT Goal 2   Goal Identifier fecal incontinence   Goal Description pt will improve pelvic floor strength and coordination to reduce fecal incontinence episodes to <1x/month   Rationale to maximize safety and independence with performance of ADLs and functional tasks   Target Date 06/11/24   PT Goal 3   Goal Identifier bowel movements   Goal Description pt will have bowel movements >6x/week that are soft formed, feeling like she is empty without straining   Rationale to maximize safety and independence with performance of ADLs and functional tasks   Target Date 06/11/24   Subjective Report   Subjective Report see eval   Treatment Interventions (PT)   Interventions Therapeutic Procedure/Exercise;Therapeutic Activity;Self Care/Home Management   Therapeutic Procedure/Exercise   Therapeutic Procedures: strength, endurance, ROM, flexibility minutes (80762) 15   Skilled Intervention cueing for dosing and technique   Patient Response/Progress pt demonstrated and verbalized understanding with some difficulty to fully feel pelvic floro movement   PTRx Ther Proc 1 Pelvic Floor Muscle Strengthening Quick Flicks   PTRx Ther Proc 1 -  "Details ensuring relaxation between contractions   PTRx Ther Proc 2 Pelvic Floor Muscle Strengthening Basic   PTRx Ther Proc 2 - Details strengthening and lengthening, no breath holding   PTRx Ther Proc 3 UE HEP After Pectus - Breathing Standing   PTRx Ther Proc 3 - Details working on lower rib and abdominal expansion, feeling pelvic floor movement   Self Care/home Management   ADL/Home Mgmt Training (03722) 20   Self Care Self Care 2;Self Care 3;Self Care 4   Self Care 1 education in pelvic floor anatomy prior to examination   Self Care 1 - Details use of pelvic model and pictures to educate on pelvic muscle function and anatomy. options for pelvic floor assessment including external visualization and or palpation, internal exam, biofeedback, pt offerred ability to consent or defer   Skilled Intervention Education in bladder function, muscle function, patient consent, and self care techniques   Patient Response/Progress pt verbalized understanding   Self Care 2 bladder diary   Self Care 2 - Details pt given bladder diary and educated on the purpose. pt to fill out voiding frequency, urgency, fluid and food intake, leakage events and amount. To be reviewed at next visit to determine behavioral modifications needed   Self Care 3 General bladder education   Self Care 3 - Details used photos/model, education provided on normal voiding patterns within 2-4 hour cycle including muscle functions and synergies.  Provided list of potential bladder irritants with education that these do not effect everyone the same. Education in \"normal\" fluid recommendations to be 1/2 body weight in ounces with >70% of this to be water, but mostly determind by light lemonade color of urine.   Self Care 4 general bowel education   Self Care 4 - Details used photos/model, education provided on normal voiding and muscle actions, provided with handout on fiber types and examples of each, education on normal bowel habits from once every 3 days to " 3x/day, fluid intake affecting bowel habits   Eval/Assessments   PT Eval, Low Complexity Minutes (62093) 40   Education   Learner/Method Patient   Plan   Home program ptrx printout   Plan for next session review bladder diary, give bowel diary, work on deep breathing with weight, biofeedback, coordination, toileting techniques, discuss tissue health   Total Session Time   Timed Code Treatment Minutes 35   Total Treatment Time (sum of timed and untimed services) 75           DISCHARGE  Reason for Discharge: Patient discharged due to lack of follow up.    Equipment Issued: HEP    Discharge Plan: Patient to continue home program.    Referring Provider:  Pratik Brice

## 2024-11-24 ENCOUNTER — HEALTH MAINTENANCE LETTER (OUTPATIENT)
Age: 64
End: 2024-11-24

## 2024-12-09 ENCOUNTER — LAB REQUISITION (OUTPATIENT)
Dept: LAB | Facility: CLINIC | Age: 64
End: 2024-12-09

## 2024-12-09 DIAGNOSIS — R25.2 CRAMP AND SPASM: ICD-10-CM

## 2024-12-09 DIAGNOSIS — E78.2 MIXED HYPERLIPIDEMIA: ICD-10-CM

## 2024-12-09 PROCEDURE — 83735 ASSAY OF MAGNESIUM: CPT | Performed by: FAMILY MEDICINE

## 2024-12-09 PROCEDURE — 84155 ASSAY OF PROTEIN SERUM: CPT | Performed by: FAMILY MEDICINE

## 2024-12-09 PROCEDURE — 80061 LIPID PANEL: CPT | Mod: ORL | Performed by: FAMILY MEDICINE

## 2024-12-09 PROCEDURE — 82310 ASSAY OF CALCIUM: CPT | Performed by: FAMILY MEDICINE

## 2024-12-09 PROCEDURE — 84075 ASSAY ALKALINE PHOSPHATASE: CPT | Performed by: FAMILY MEDICINE

## 2024-12-10 LAB
ALBUMIN SERPL BCG-MCNC: 4 G/DL (ref 3.5–5.2)
ALP SERPL-CCNC: 70 U/L (ref 40–150)
ALT SERPL W P-5'-P-CCNC: 16 U/L (ref 0–50)
ANION GAP SERPL CALCULATED.3IONS-SCNC: 11 MMOL/L (ref 7–15)
AST SERPL W P-5'-P-CCNC: 26 U/L (ref 0–45)
BILIRUB SERPL-MCNC: 0.2 MG/DL
BUN SERPL-MCNC: 12.9 MG/DL (ref 8–23)
CALCIUM SERPL-MCNC: 9.1 MG/DL (ref 8.8–10.4)
CHLORIDE SERPL-SCNC: 104 MMOL/L (ref 98–107)
CHOLEST SERPL-MCNC: 237 MG/DL
CREAT SERPL-MCNC: 0.84 MG/DL (ref 0.51–0.95)
EGFRCR SERPLBLD CKD-EPI 2021: 77 ML/MIN/1.73M2
FASTING STATUS PATIENT QL REPORTED: NO
FASTING STATUS PATIENT QL REPORTED: NO
GLUCOSE SERPL-MCNC: 87 MG/DL (ref 70–99)
HCO3 SERPL-SCNC: 26 MMOL/L (ref 22–29)
HDLC SERPL-MCNC: 88 MG/DL
LDLC SERPL CALC-MCNC: 135 MG/DL
MAGNESIUM SERPL-MCNC: 2.2 MG/DL (ref 1.7–2.3)
NONHDLC SERPL-MCNC: 149 MG/DL
POTASSIUM SERPL-SCNC: 3.9 MMOL/L (ref 3.4–5.3)
PROT SERPL-MCNC: 6.7 G/DL (ref 6.4–8.3)
SODIUM SERPL-SCNC: 141 MMOL/L (ref 135–145)
TRIGL SERPL-MCNC: 69 MG/DL

## 2025-05-06 ENCOUNTER — PATIENT OUTREACH (OUTPATIENT)
Dept: CARE COORDINATION | Facility: CLINIC | Age: 65
End: 2025-05-06
Payer: COMMERCIAL

## 2025-06-10 ENCOUNTER — ANCILLARY PROCEDURE (OUTPATIENT)
Dept: MAMMOGRAPHY | Facility: CLINIC | Age: 65
End: 2025-06-10
Attending: FAMILY MEDICINE
Payer: COMMERCIAL

## 2025-06-10 DIAGNOSIS — Z12.31 VISIT FOR SCREENING MAMMOGRAM: ICD-10-CM

## 2025-06-10 PROCEDURE — 77063 BREAST TOMOSYNTHESIS BI: CPT
